# Patient Record
Sex: MALE | Race: WHITE | Employment: FULL TIME | URBAN - METROPOLITAN AREA
[De-identification: names, ages, dates, MRNs, and addresses within clinical notes are randomized per-mention and may not be internally consistent; named-entity substitution may affect disease eponyms.]

---

## 2017-02-15 ENCOUNTER — OFFICE VISIT (OUTPATIENT)
Dept: DERMATOLOGY | Facility: AMBULATORY SURGERY CENTER | Age: 57
End: 2017-02-15

## 2017-02-15 VITALS
BODY MASS INDEX: 33.77 KG/M2 | TEMPERATURE: 98.7 F | DIASTOLIC BLOOD PRESSURE: 84 MMHG | HEIGHT: 69 IN | OXYGEN SATURATION: 98 % | RESPIRATION RATE: 18 BRPM | WEIGHT: 228 LBS | HEART RATE: 60 BPM | SYSTOLIC BLOOD PRESSURE: 124 MMHG

## 2017-02-15 DIAGNOSIS — C44.722 SQUAMOUS CELL CARCINOMA, LEG, RIGHT: Primary | ICD-10-CM

## 2017-02-15 DIAGNOSIS — C44.722 SQUAMOUS CELL CARCINOMA, LEG, RIGHT: ICD-10-CM

## 2017-02-15 RX ORDER — LIDOCAINE HYDROCHLORIDE AND EPINEPHRINE 10; 10 MG/ML; UG/ML
3 INJECTION, SOLUTION INFILTRATION; PERINEURAL ONCE
Qty: 1 VIAL | Refills: 0
Start: 2017-02-15 | End: 2017-02-15

## 2017-02-15 RX ORDER — CEPHALEXIN 500 MG/1
500 CAPSULE ORAL 3 TIMES DAILY
Qty: 21 CAP | Refills: 1 | Status: SHIPPED | OUTPATIENT
Start: 2017-02-15 | End: 2017-10-03 | Stop reason: ALTCHOICE

## 2017-02-15 RX ORDER — BUPIVACAINE HYDROCHLORIDE AND EPINEPHRINE 2.5; 5 MG/ML; UG/ML
INJECTION, SOLUTION INFILTRATION; PERINEURAL
Qty: 1.5 ML | Refills: 0
Start: 2017-02-15 | End: 2017-10-03

## 2017-02-15 NOTE — PROGRESS NOTES
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 Procedure explained with full understanding. Vitals:    02/15/17 0832   BP: 124/84   Pulse: 60   Resp: 18   Temp: 98.7 °F (37.1 °C)   TempSrc: Oral   SpO2: 98%   Weight: 103.4 kg (228 lb)   Height: 5' 9\" (1.753 m)     preoperatively, will continue to monitor. Post-op: Written and verbal post-op wound care instructions given to patient with full understanding of care. Surgical wound bandaged with Vaseline, Telfa, 2x2 gauze, and coverall tape. All questions and concerns addressed. Vitals stable postoperatively.

## 2017-02-15 NOTE — PROGRESS NOTES
This note is written by John Tobias, as dictated by Judah Martínez. Cyril Shabazz MD.    CC: Squamous cell carcinomas on the right anterior shin and right posterior calf    History of present illness:     Micaela Norris is a 61 y.o. male referred by Dr. Charlotte Carroll. He has two biopsy-proven squamous cell carcinomas on the right anterior shin and right posterior calf. These are new squamous cell carcinomas, no prior treatment for either. The lesion on his right anterior shin presented approximately 3 months ago, readily identifiable as similar to Mr. Sara Pryor other lower leg skin cancers. He reports that the lesion on his right posterior calf has been present \"a little longer,\" 3-4 weeks before he noticed the lesion. He had a patch of eczema at the site and had treated with topical steroid, concern arose when this bump remained. He describes this lesion as more tender and inflamed. Biopsies confirmed the diagnoses of squamous cell carcinomas, and I reviewed the written pathology reports. He also notes the his right lower leg has been more problematic in general - hx of DVT, skin cancers, and pain. He is feeling well and in his usual state of health today. He has no pain, no current illnesses, no other skin concerns. His allergies, medications, medical, and social history are reviewed by me today. I treated a squamous cell carcinoma on his right lower medial leg with Mohs surgery in 2016, no symptoms associated with the site. He takes Xarelto. Exam:     He is an awake, alert, and oriented 61 y.o. male who appears well and in no distress. There is no preauricular, submandibular, or cervical lymphadenopathy. I examined his right lower leg. He has a 11 x 6 mm crusted papule with minimal inflammation on his right anterior shin and a 10 x 9 mm inflamed biopsy site on his right posterior calf. He confirms locations.  He has a well-healed scar on his right lower medial leg, no evidence of lesion recurrence. Assessment/plan:    1. Squamous cell carcinoma, right anterior shin. I discussed the diagnosis of squamous cell carcinoma and summarized the pathology report. Mohs surgery is indicated by site, size, and rapid growth. The procedure was discussed, verbal and written consent were obtained. I performed the procedure. One stage was required to reach a tumor free plane. The surgical defect was managed with intermediate repair. There were no complications. He will follow up as needed as the site heals. Indications, risks, and options were discussed with Earlene Shelby preoperatively. Risks including, but not limited to: pain, bleeding, infection, tumor recurrence, scarring and damage to motor and/or sensory nerves, were discussed. Earlene Shelby chose Mohs surgery. Earlene Shelby was an acceptable surgery candidate. Earlene Shelby was placed in the appropriate position on the operating table in the Mohs surgery procedure room. The area was prepped and draped in the standard manner. Gentian violet was used to outline the clinical margins of the tumor. Local anesthesia was then obtained. The grossly visible tumor was then removed, an underlying layer was excised and mapped according to the Mohs technique, and the individual specimens examined microscopically. The process was repeated until microscopic examination of the tissue specimens confirmed a tumor-free plane. Hemostasis was obtained with electrosurgery and pressure. The wound was covered between stages with moist saline gauze. Wound care instructions (written and verbal) and a follow up appointment were given to Earlene Shelby before discharge. Earlene Shelby was discharged in good condition. The wound management options of second intent healing, layered closure, local flap, and/or full thickness skin graft were discussed.  Earlene Shelby understands the aims, risks, alternatives, and possible complications and elects to proceed with an intermediate layered closure. Wound margins were made vertical, edges undermined in the subcutaneous plane, standing cones corrected at both poles followed by layered closure. The wound was closed with buried 4-0 polysorb suture in the subcutis to reduce tension on the skin edges, and skin edges were approximated with 4-0 prolene suture in the dermis to reduce tension on the epidermis. The final closure length was 35 mm. The wound was bandaged with a pressure bandage utilizing Petroleum ointment, Telfa, gauze and Coban. Wound care instructions (written and verbal) and a follow up appointment were given to Jyoti Foote before discharge. Jyoti Foote was discharged in good condition. 2.  Squamous cell carcinoma, right posterior calf. I discussed the diagnosis of squamous cell carcinoma and summarized the pathology report. Mohs surgery is indicated by site, size, and poor definition. The procedure was discussed, verbal and written consent were obtained. I performed the procedure. Two stages was required to reach a tumor free plane. The surgical defect was managed with intermediate repair. There were no complications. He will follow up as needed as the site heals. I prescribed cephalexin 500 mg tid post-op due to inflammation at this site. Indications, risks, and options were discussed with Jyoti Foote preoperatively. Risks including, but not limited to: pain, bleeding, infection, tumor recurrence, scarring and damage to motor and/or sensory nerves, were discussed. Jyoti Foote chose Mohs surgery. Jyoti Foote was an acceptable surgery candidate. Jyoti Foote was placed in the appropriate position on the operating table in the Mohs surgery procedure room. The area was prepped and draped in the standard manner. Gentian violet was used to outline the clinical margins of the tumor. Local anesthesia was then obtained.      The grossly visible tumor was then removed, an underlying layer was excised and mapped according to the Mohs technique, and the individual specimens examined microscopically. The process was repeated until microscopic examination of the tissue specimens confirmed a tumor-free plane. Hemostasis was obtained with electrosurgery and pressure. The wound was covered between stages with moist saline gauze. Wound care instructions (written and verbal) and a follow up appointment were given to Myra Kiran before discharge. Myra Kiran was discharged in good condition. The wound management options of second intent healing, layered closure, local flap, and/or full thickness skin graft were discussed. Myra Kiran understands the aims, risks, alternatives, and possible complications and elects to proceed with an intermediate layered closure. Wound margins were made vertical, edges undermined in the subcutaneous plane, standing cones corrected at both poles followed by layered closure. The wound was closed with buried 4-0 polysorb suture in the subcutis to reduce tension on the skin edges, and skin edges were approximated with 4-0 prolene suture in the dermis to reduce tension on the epidermis. The final closure length was 30 mm. The wound was bandaged with a pressure bandage utilizing Petroleum ointment, Telfa, gauze and Coban. Wound care instructions (written and verbal) and a follow up appointment were given to Myra Kiran before discharge. Myra Kiran was discharged in good condition. 3. History of squamous cell carcinoma. I discussed the diagnosis and recommend routine examinations with Dr. Patrica Lowe for surveillance. The documentation recorded by the scribe accurately reflects the service I personally performed and the decisions made by me.      [facility]   OFFICE PROCEDURE PROGRESS NOTE     Chart reviewed for the following:     Megan Fair Rajeev Lawrence MD, have reviewed the History, Physical and updated the Allergic reactions for [address] performed immediately prior to start of procedure:     Tess Krause MD, have performed the following reviews on Shane Nesbitt prior to the start of the procedure:     * Patient was identified by name and date of birth   * Agreement on procedure being performed was verified   * Risks and Benefits explained to the patient   * Procedure site verified and marked as necessary   * Patient was positioned for comfort   * Consent was signed and verified     Time: 8:40 AM  Date of procedure: 2/15/2017  Procedure performed by: He Sabillon.  Jose Krause MD   Provider assisted by: LPN   Patient assisted by: self   How tolerated by patient: tolerated the procedure well with no complications   Comments: none

## 2017-02-15 NOTE — MR AVS SNAPSHOT
Visit Information Date & Time Provider Department Dept. Phone Encounter #  
 2/15/2017  8:30 AM MD Travis Trivedi [id number] [phone] [id number] Your Appointments 5/8/2017  2:00 PM  
SURGERY with MD Travis Trivedi [address] [facility]) Appt Note: est pt: SCC-Right Lateral Leg-Dr. Dominik Kamara [facility] [id number]  
[address] [place] [id number] Upcoming Health Maintenance Date Due COLONOSCOPY 6/24/2021 DTaP/Tdap/Td series (3 - Td) 3/7/2026 Allergies as of 2/15/2017  Review Complete On: 2/15/2017 By: Sravanthi Brenner LPN Severity Noted Reaction Type Reactions Codeine  05/10/2011   Topical Rash, Itching Current Immunizations  Reviewed on 9/23/2016 Name Date TDAP Vaccine 1/1/2008 Tdap 3/7/2016 Not reviewed this visit You Were Diagnosed With   
  
 Codes Comments Squamous cell carcinoma, leg, right    -  Primary ICD-10-CM: J00.161 ICD-9-CM: 173.72 Squamous cell carcinoma, leg, right     ICD-10-CM: Z49.341 ICD-9-CM: 173.72 Vitals BP Pulse Temp Resp Height(growth percentile) Weight(growth percentile) 124/84 (BP 1 Location: Left arm, BP Patient Position: Sitting) 60 98.7 °F (37.1 °C) (Oral) 18 5' 9\" (1.753 m) 228 lb (103.4 kg) SpO2 BMI Smoking Status 98% 33.67 kg/m2 Never Smoker Vitals History BMI and BSA Data Body Mass Index Body Surface Area  
 33.67 kg/m 2 2.24 m 2 Preferred Pharmacy Pharmacy Name Phone [facility] [address] [phone] Your Updated Medication List  
  
   
This list is accurate as of: 2/15/17  9:04 AM.  Always use your most recent med list.  
  
  
  
  
 lovastatin 20 mg tablet Commonly known as:  MEVACOR  
TAKE ONE TABLET BY MOUTH NIGHTLY AT BEDTIME  
 multivitamin capsule Take 1 capsule by mouth. XARELTO 20 mg Tab tablet Generic drug:  rivaroxaban TAKE ONE TABLET BY MOUTH ONE TIME DAILY ZyrTEC 10 mg Cap Generic drug:  Cetirizine Take 1 capsule by mouth as needed. Patient Instructions WOUND CARE INSTRUCTIONS 1. Keep the dressing clean and dry and do not remove for 48 hours. 2. Then change the dressing once a day as follows: 
a. Wash hands before and after each dressing change. b. Remove dressing and wash site gently with mild soap and water, rinse, and pat dry. 
c. Apply an ointment (Bacitracin, Polysporin, Neosporin, Petroleum jelly or Aquaphor). d. Apply a non-stick (Telfa) dressing or Band-Aid to cover the wound. Remove pressure bandage Friday, then wash gently and apply Vaseline and a band-aid to site daily for 1 week. 3. Watch for: BLEEDING: A small amount of drainage may occur. If bleeding occurs, elevate and rest the surgery site. Apply gauze and steady pressure for 15 minutes. If bleeding continues, call this office. INFECTION: Signs of infection include increased redness, pain, warmth, drainage of pus, and fever. If this occurs, call this office. 4. Special Instructions (follow any that are checked): ·  You have stitches that need to be removed in 14 days ·  Avoid bending at the waist and heavy lifting for two days. ·  Sleep with your head elevated for the next two nights. ·  Rest the surgery site and keep it elevated as much as possible for two days. ·  You may apply an ice-pack for 10-15 minutes every waking hour for the rest of the day. ·  Eat a soft diet and avoid hot food and hot drinks for the rest of the day. ·  Other instructions: Follow up as needed Take Tylenol for pain as needed.  
 
 
Once the site is healed with no remaining bandages or open areas, protect your surgical site and scar from the sun, as this area will be more sensitive. Use a broad spectrum sunscreen SPF 30 or higher daily, and a chemical free product (one containing zinc oxide or titanium dioxide) is a good choice if the area is sensitive. You may begin to gently massage the surgical site in 2-3 weeks, rubbing in a circular motion along the scar. This can help reduce swelling and thickness of a scar. A scar cream may be used beginnning 1 month after the surgery. If you have any questions or concerns, please call our office Monday through Friday at [phone]. Introducing [facility]! Dear [employer]: 
Thank you for requesting a [employer] account. Our records indicate that you already have an active [employer] account. You can access your account anytime at [email or web address] Did you know that you can access your hospital and ER discharge instructions at any time in [employer]? You can also review all of your test results from your hospital stay or ER visit. Additional Information If you have questions, please visit the Frequently Asked Questions section of the [employer] website at [email or web address] Remember, [employer] is NOT to be used for urgent needs. For medical emergencies, dial 911. Now available from your iPhone and Android! Please provide this summary of care documentation to your next provider. Your primary care clinician is listed as Zulma Dyer. If you have any questions after today's visit, please call [phone].

## 2017-02-15 NOTE — PATIENT INSTRUCTIONS
WOUND CARE INSTRUCTIONS    1. Keep the dressing clean and dry and do not remove for 48 hours. 2. Then change the dressing once a day as follows:  a. Wash hands before and after each dressing change. b. Remove dressing and wash site gently with mild soap and water, rinse, and pat dry.  c. Apply an ointment (Bacitracin, Polysporin, Neosporin, Petroleum jelly or Aquaphor). d. Apply a non-stick (Telfa) dressing or Band-Aid to cover the wound. Remove pressure bandage Friday, then wash gently and apply Vaseline and a band-aid to site daily for 1 week. 3. Watch for:  BLEEDING: A small amount of drainage may occur. If bleeding occurs, elevate and rest the surgery site. Apply gauze and steady pressure for 15 minutes. If bleeding continues, call this office. INFECTION: Signs of infection include increased redness, pain, warmth, drainage of pus, and fever. If this occurs, call this office. 4. Special Instructions (follow any that are checked):  · [x] You have stitches that need to be removed in 14 days  · [] Avoid bending at the waist and heavy lifting for two days. · [] Sleep with your head elevated for the next two nights. · [x] Rest the surgery site and keep it elevated as much as possible for two days. · [x] You may apply an ice-pack for 10-15 minutes every waking hour for the rest of the day. · [] Eat a soft diet and avoid hot food and hot drinks for the rest of the day. · [] Other instructions: Follow up as needed  Take Tylenol for pain as needed. Once the site is healed with no remaining bandages or open areas, protect your surgical site and scar from the sun, as this area will be more sensitive. Use a broad spectrum sunscreen SPF 30 or higher daily, and a chemical free product (one containing zinc oxide or titanium dioxide) is a good choice if the area is sensitive. You may begin to gently massage the surgical site in 2-3 weeks, rubbing in a circular motion along the scar.  This can help reduce swelling and thickness of a scar. A scar cream may be used beginnning 1 month after the surgery. If you have any questions or concerns, please call our office Monday through Friday at [phone].

## 2017-03-01 RX ORDER — LOVASTATIN 20 MG/1
TABLET ORAL
Qty: 90 TAB | Refills: 1 | Status: SHIPPED | OUTPATIENT
Start: 2017-03-01 | End: 2017-10-04 | Stop reason: SDUPTHER

## 2017-03-28 ENCOUNTER — TELEPHONE (OUTPATIENT)
Dept: DERMATOLOGY | Facility: AMBULATORY SURGERY CENTER | Age: 57
End: 2017-03-28

## 2017-03-28 PROBLEM — J10.1 INFLUENZA B: Status: ACTIVE | Noted: 2017-03-22

## 2017-07-26 RX ORDER — RIVAROXABAN 20 MG/1
TABLET, FILM COATED ORAL
Qty: 90 TAB | Refills: 0 | Status: SHIPPED | OUTPATIENT
Start: 2017-07-26 | End: 2017-08-28 | Stop reason: SDUPTHER

## 2017-08-28 RX ORDER — RIVAROXABAN 20 MG/1
TABLET, FILM COATED ORAL
Qty: 90 TAB | Refills: 0 | Status: SHIPPED | OUTPATIENT
Start: 2017-08-28 | End: 2018-01-29 | Stop reason: SDUPTHER

## 2017-08-28 NOTE — TELEPHONE ENCOUNTER
[phone] pt's wife calling regarding this med and says that her  is completely out and needs refill asap.

## 2017-10-03 ENCOUNTER — OFFICE VISIT (OUTPATIENT)
Dept: INTERNAL MEDICINE CLINIC | Age: 57
End: 2017-10-03

## 2017-10-03 VITALS
HEART RATE: 74 BPM | DIASTOLIC BLOOD PRESSURE: 80 MMHG | TEMPERATURE: 98.3 F | SYSTOLIC BLOOD PRESSURE: 124 MMHG | BODY MASS INDEX: 33.62 KG/M2 | WEIGHT: 227 LBS | HEIGHT: 69 IN | OXYGEN SATURATION: 97 % | RESPIRATION RATE: 14 BRPM

## 2017-10-03 DIAGNOSIS — Z23 ENCOUNTER FOR IMMUNIZATION: ICD-10-CM

## 2017-10-03 DIAGNOSIS — M25.551 RIGHT HIP PAIN: Primary | ICD-10-CM

## 2017-10-03 NOTE — MR AVS SNAPSHOT
Visit Information Date & Time Provider Department Dept. Phone Encounter #  
 10/3/2017  4:30 PM Sharon Rodriguez, 1229 Counts include 234 beds at the [facility] [phone] [id number] Follow-up Instructions Return if symptoms worsen or fail to improve. Your Appointments 10/24/2017 10:00 AM  
SURGERY with MD Travis Jaquez [address] [facility]) Appt Note: Np SCC Rt Posterior Leg Dr. Gavin Andrews  
 [facility] [address] [facility] [id number] [place] [id number] Upcoming Health Maintenance Date Due INFLUENZA AGE 9 TO ADULT 8/1/2017 COLONOSCOPY 6/24/2021 DTaP/Tdap/Td series (3 - Td) 3/7/2026 Allergies as of 10/3/2017  Review Complete On: 10/3/2017 By: Sharon Rodriguez MD  
  
 Severity Noted Reaction Type Reactions Codeine  05/10/2011   Topical Rash, Itching Current Immunizations  Reviewed on 10/3/2017 Name Date Influenza Vaccine (Quad) PF  Incomplete TDAP Vaccine 1/1/2008 Tdap 3/7/2016 Reviewed by Diana Smith RN on 10/3/2017 at  4:27 PM  
You Were Diagnosed With   
  
 Codes Comments Right hip pain    -  Primary ICD-10-CM: M25.551 ICD-9-CM: 719.45 Encounter for immunization     ICD-10-CM: R03 ICD-9-CM: V03.89 Vitals BP Pulse Temp Resp Height(growth percentile) Weight(growth percentile) 124/80 74 98.3 °F (36.8 °C) (Oral) 14 5' 9\" (1.753 m) 227 lb (103 kg) SpO2 BMI Smoking Status 97% 33.52 kg/m2 Never Smoker BMI and BSA Data Body Mass Index Body Surface Area  
 33.52 kg/m 2 2.24 m 2 Preferred Pharmacy Pharmacy Name Phone [facility] [address] [phone] Your Updated Medication List  
  
   
This list is accurate as of: 10/3/17  4:59 PM.  Always use your most recent med list.  
  
  
  
  
 lovastatin 20 mg tablet Commonly known as:  MEVACOR  
TAKE ONE TABLET BY MOUTH NIGHTLY AT BEDTIME  
  
 multivitamin capsule Take 1 capsule by mouth. XARELTO 20 mg Tab tablet Generic drug:  rivaroxaban TAKE 1 TABLET BY MOUTH ONCE DAILY ZyrTEC 10 mg Cap Generic drug:  Cetirizine Take 1 capsule by mouth as needed. We Performed the Following INFLUENZA VIRUS VAC QUAD,SPLIT,PRESV FREE SYRINGE IM [id number] CPT(R)] VA IMMUNIZ ADMIN,1 SINGLE/COMB VAC/TOXOID [id number] CPT(R)] Follow-up Instructions Return if symptoms worsen or fail to improve. Patient Instructions Heat: apply heating pad 10-15 minutes at a time 3-4 times per day *Can not take these medications together. *You can take Tylenol 500mg (1 tabs every 6 hours, max dose of acetaminophen in 24 hrs is 3,000mg) Stretching: 
Start stretching/exercises (see below). Try to do this 1-2 times per day as tolerated. Hip Arthritis: Exercises Your Care Instructions Here are some examples of exercises for hip arthritis. Start each exercise slowly. Ease off the exercise if you start to have pain. Your doctor or physical therapist will tell you when you can start these exercises and which ones will work best for you. How to do the exercises Straight-leg raises to the outside 1. Lie on your side, with your affected hip on top. 2. Tighten the front thigh muscles of your top leg to keep your knee straight. 3. Keep your hip and your leg straight in line with the rest of your body, and keep your knee pointing forward. Do not drop your hip back. 4. Lift your top leg straight up toward the ceiling, about 12 inches off the floor. Hold for about 6 seconds, then slowly lower your leg. 5. Repeat 8 to 12 times. 6. Switch legs and repeat steps 1 through 5, even if only one hip is sore. Straight-leg raises to the inside 1. Lie on your side with your affected hip on the floor. 2. You can either prop up your other leg on a chair, or you can bend that knee and put that foot in front of your other knee. Do not drop your hip back. 3. Tighten the muscles on the front thigh of your bottom leg to straighten that knee. 4. Keep your kneecap pointing forward and your leg straight, and lift your bottom leg up toward the ceiling about 6 inches. Hold for about 6 seconds, then lower slowly. 5. Repeat 8 to 12 times. 6. Switch legs and repeat steps 1 through 5, even if only one hip is sore. Hip hike 1. Stand sideways on the bottom step of a staircase, and hold on to the banister or wall. 2. Keeping both knees straight, lift your good leg off the step and let it hang down. Then hike your good hip up to the same level as your affected hip or a little higher. 3. Repeat 8 to 12 times. 4. Switch legs and repeat steps 1 through 3, even if only one hip is sore. Bridging 1. Lie on your back with both knees bent. Your knees should be bent about 90 degrees. 2. Then push your feet into the floor, squeeze your buttocks, and lift your hips off the floor until your shoulders, hips, and knees are all in a straight line. 3. Hold for about 6 seconds as you continue to breathe normally, and then slowly lower your hips back down to the floor and rest for up to 10 seconds. 4. Repeat 8 to 12 times. Hamstring stretch (lying down) 1. Lie flat on your back with your legs straight. If you feel discomfort in your back, place a small towel roll under your lower back. 2. Holding the back of your affected leg, lift your leg straight up and toward your body until you feel a stretch at the back of your thigh. 3. Hold the stretch for at least 30 seconds. 4. Repeat 2 to 4 times. 5. Switch legs and repeat steps 1 through 4, even if only one hip is sore. Standing quadriceps stretch 1.  If you are not steady on your feet, hold on to a chair, counter, or wall. You can also lie on your stomach or your side to do this exercise. 2. Bend the knee of the leg you want to stretch, and reach behind you to grab the front of your foot or ankle with the hand on the same side. For example, if you are stretching your right leg, use your right hand. 3. Keeping your knees next to each other, pull your foot toward your buttock until you feel a gentle stretch across the front of your hip and down the front of your thigh. Your knee should be pointed directly to the ground, and not out to the side. 4. Hold the stretch for at least 15 to 30 seconds. 5. Repeat 2 to 4 times. 6. Switch legs and repeat steps 1 through 5, even if only one hip is sore. Hip rotator stretch 1. Lie on your back with both knees bent and your feet flat on the floor. 2. Put the ankle of your affected leg on your opposite thigh near your knee. 3. Use your hand to gently push your knee away from your body until you feel a gentle stretch around your hip. 4. Hold the stretch for 15 to 30 seconds. 5. Repeat 2 to 4 times. 6. Repeat steps 1 through 5, but this time use your hand to gently pull your knee toward your opposite shoulder. 7. Switch legs and repeat steps 1 through 6, even if only one hip is sore. Knee-to-chest 
 
1. Lie on your back with your knees bent and your feet flat on the floor. 2. Bring your affected leg to your chest, keeping the other foot flat on the floor (or keeping the other leg straight, whichever feels better on your lower back). 3. Keep your lower back pressed to the floor. Hold for at least 15 to 30 seconds. 4. Relax, and lower the knee to the starting position. 5. Repeat 2 to 4 times. 6. Switch legs and repeat steps 1 through 5, even if only one hip is sore. 7. To get more stretch, put your other leg flat on the floor while pulling your knee to your chest. 
Clamshell 1. Lie on your side, with your affected hip on top.  Keep your feet and knees together and your knees bent. 2. Raise your top knee, but keep your feet together. Do not let your hips roll back. Your legs should open up like a clamshell. 3. Hold for 6 seconds. 4. Slowly lower your knee back down. Rest for 10 seconds. 5. Repeat 8 to 12 times. 6. Switch legs and repeat steps 1 through 5, even if only one hip is sore. Follow-up care is a key part of your treatment and safety. Be sure to make and go to all appointments, and call your doctor if you are having problems. It's also a good idea to know your test results and keep a list of the medicines you take. Where can you learn more? Go to http://dani-adelita.info/. Enter P057 in the search box to learn more about \"Hip Arthritis: Exercises. \" Current as of: March 21, 2017 Content Version: 11.3 © 7749-6532 [employer]. Care instructions adapted under license by [employer] (which disclaims liability or warranty for this information). If you have questions about a medical condition or this instruction, always ask your healthcare professional. [place] [address] any warranty or liability for your use of this information. Introducing [facility]! Dear Kimberly Hughes: 
Thank you for requesting a [employer] account. Our records indicate that you already have an active [employer]Live account. You can access your account anytime at [email or web address] Whatâ€™s More Alive Than You/Helmedix Did you know that you can access your hospital and ER discharge instructions at any time in [employer]Live? You can also review all of your test results from your hospital stay or ER visit. Additional Information If you have questions, please visit the Frequently Asked Questions section of the [employer]Live website at [email or web address] Whatâ€™s More Alive Than You/[email or web address]/. Remember, [employer]Live is NOT to be used for urgent needs. For medical emergencies, dial 911. Now available from your iPhone and Android! Please provide this summary of care documentation to your next provider. Your primary care clinician is listed as Benita Hi. If you have any questions after today's visit, please call [phone].

## 2017-10-03 NOTE — PROGRESS NOTES
Mike Lira is a 61 y.o. male who was seen in clinic today (10/3/2017). He RTC with his wife    Assessment & Plan:  Diagnoses and all orders for this visit:    1. Right hip pain- this is a new problem, symptoms are: not changed, differential dx reviewed with the patient, and is of unclear etiology. Favor more muscular than OA. Reviewed history of DVT and unlikely. Reviewed pelvic pathology, unlikely at this time. Not reproducible today. Will treat with: heat, rest, stretching. See AVS, Red flags were reviewed with the patient to RTC or notify me, expected time course for resolution reviewed. 2. Encounter for immunization  -     INFLUENZA VIRUS VACCINE QUADRIVALENT, PRESERVATIVE FREE SYRINGE (19602)  -     GA IMMUNIZ ADMIN,1 SINGLE/COMB VAC/TOXOID         Follow-up Disposition:  Return if symptoms worsen or fail to improve.       ----------------------------------------------------------------------    Subjective:  James Christine was seen today for Hip Pain    He presents do to pain of his right hip that is secondary to no known injury and started a few weeks ago. Since it started his pain has not changed. He describes the pain as tooth aches. It is constant but fluctuating in intensity (better in the AM and worse in the PM). The pain radiates: no where. He denies numbness, denies paresthesias. Exacerbating factors identifiable by patient are sitting. He has tried the following: Tylenol and standing. These have been: temporarily effective. Previous workup: none. CT abd/pelvis: 7/6/11- reports degenerative changes in bilateral hips        Prior to Admission medications    Medication Sig Start Date End Date Taking?  Authorizing Provider   XARELTO 20 mg tab tablet TAKE 1 TABLET BY MOUTH ONCE DAILY 8/28/17  Yes Julia Michaels MD   lovastatin (MEVACOR) 20 mg tablet TAKE ONE TABLET BY MOUTH NIGHTLY AT BEDTIME 3/1/17  Yes Julia Michaels MD   Cetirizine (ZYRTEC) 10 mg Cap Take 1 capsule by mouth as needed. Yes Historical Provider   multivitamin capsule Take 1 capsule by mouth. Yes Historical Provider          Allergies   Allergen Reactions    Codeine Rash and Itching           ROS - per HPI       Objective:   Physical Exam   Cardiovascular: Regular rhythm and normal heart sounds. No murmur heard. Pulmonary/Chest: Effort normal and breath sounds normal. He has no wheezes. He has no rales. Abdominal: Soft. Bowel sounds are normal. He exhibits no mass. There is no hepatosplenomegaly. There is no tenderness. No hernia. Musculoskeletal:        Right hip: He exhibits normal range of motion, normal strength, no tenderness, no bony tenderness and no deformity. Visit Vitals    /80    Pulse 74    Temp 98.3 °F (36.8 °C) (Oral)    Resp 14    Ht 5' 9\" (1.753 m)    Wt 227 lb (103 kg)    SpO2 97%    BMI 33.52 kg/m2         Disclaimer:  Advised him to call back or return to office if symptoms worsen/change/persist.  Discussed expected course/resolution/complications of diagnosis in detail with patient. Medication risks/benefits/costs/interactions/alternatives discussed with patient. He was given an after visit summary which includes diagnoses, current medications, & vitals. He expressed understanding with the diagnosis and plan.         Yordan Santos MD

## 2017-10-03 NOTE — PATIENT INSTRUCTIONS
Heat: apply heating pad 10-15 minutes at a time 3-4 times per day      *Can not take these medications together. *You can take Tylenol 500mg (1 tabs every 6 hours, max dose of acetaminophen in 24 hrs is 3,000mg)    Stretching:  Start stretching/exercises (see below). Try to do this 1-2 times per day as tolerated. Hip Arthritis: Exercises  Your Care Instructions  Here are some examples of exercises for hip arthritis. Start each exercise slowly. Ease off the exercise if you start to have pain. Your doctor or physical therapist will tell you when you can start these exercises and which ones will work best for you. How to do the exercises  Straight-leg raises to the outside    1. Lie on your side, with your affected hip on top. 2. Tighten the front thigh muscles of your top leg to keep your knee straight. 3. Keep your hip and your leg straight in line with the rest of your body, and keep your knee pointing forward. Do not drop your hip back. 4. Lift your top leg straight up toward the ceiling, about 12 inches off the floor. Hold for about 6 seconds, then slowly lower your leg. 5. Repeat 8 to 12 times. 6. Switch legs and repeat steps 1 through 5, even if only one hip is sore. Straight-leg raises to the inside    1. Lie on your side with your affected hip on the floor. 2. You can either prop up your other leg on a chair, or you can bend that knee and put that foot in front of your other knee. Do not drop your hip back. 3. Tighten the muscles on the front thigh of your bottom leg to straighten that knee. 4. Keep your kneecap pointing forward and your leg straight, and lift your bottom leg up toward the ceiling about 6 inches. Hold for about 6 seconds, then lower slowly. 5. Repeat 8 to 12 times. 6. Switch legs and repeat steps 1 through 5, even if only one hip is sore. Hip hike    1. Stand sideways on the bottom step of a staircase, and hold on to the banister or wall.   2. Keeping both knees straight, lift your good leg off the step and let it hang down. Then hike your good hip up to the same level as your affected hip or a little higher. 3. Repeat 8 to 12 times. 4. Switch legs and repeat steps 1 through 3, even if only one hip is sore. Bridging    1. Lie on your back with both knees bent. Your knees should be bent about 90 degrees. 2. Then push your feet into the floor, squeeze your buttocks, and lift your hips off the floor until your shoulders, hips, and knees are all in a straight line. 3. Hold for about 6 seconds as you continue to breathe normally, and then slowly lower your hips back down to the floor and rest for up to 10 seconds. 4. Repeat 8 to 12 times. Hamstring stretch (lying down)    1. Lie flat on your back with your legs straight. If you feel discomfort in your back, place a small towel roll under your lower back. 2. Holding the back of your affected leg, lift your leg straight up and toward your body until you feel a stretch at the back of your thigh. 3. Hold the stretch for at least 30 seconds. 4. Repeat 2 to 4 times. 5. Switch legs and repeat steps 1 through 4, even if only one hip is sore. Standing quadriceps stretch    1. If you are not steady on your feet, hold on to a chair, counter, or wall. You can also lie on your stomach or your side to do this exercise. 2. Bend the knee of the leg you want to stretch, and reach behind you to grab the front of your foot or ankle with the hand on the same side. For example, if you are stretching your right leg, use your right hand. 3. Keeping your knees next to each other, pull your foot toward your buttock until you feel a gentle stretch across the front of your hip and down the front of your thigh. Your knee should be pointed directly to the ground, and not out to the side. 4. Hold the stretch for at least 15 to 30 seconds. 5. Repeat 2 to 4 times. 6. Switch legs and repeat steps 1 through 5, even if only one hip is sore.   Hip rotator stretch    1. Lie on your back with both knees bent and your feet flat on the floor. 2. Put the ankle of your affected leg on your opposite thigh near your knee. 3. Use your hand to gently push your knee away from your body until you feel a gentle stretch around your hip. 4. Hold the stretch for 15 to 30 seconds. 5. Repeat 2 to 4 times. 6. Repeat steps 1 through 5, but this time use your hand to gently pull your knee toward your opposite shoulder. 7. Switch legs and repeat steps 1 through 6, even if only one hip is sore. Knee-to-chest    1. Lie on your back with your knees bent and your feet flat on the floor. 2. Bring your affected leg to your chest, keeping the other foot flat on the floor (or keeping the other leg straight, whichever feels better on your lower back). 3. Keep your lower back pressed to the floor. Hold for at least 15 to 30 seconds. 4. Relax, and lower the knee to the starting position. 5. Repeat 2 to 4 times. 6. Switch legs and repeat steps 1 through 5, even if only one hip is sore. 7. To get more stretch, put your other leg flat on the floor while pulling your knee to your chest.  Clamshell    1. Lie on your side, with your affected hip on top. Keep your feet and knees together and your knees bent. 2. Raise your top knee, but keep your feet together. Do not let your hips roll back. Your legs should open up like a clamshell. 3. Hold for 6 seconds. 4. Slowly lower your knee back down. Rest for 10 seconds. 5. Repeat 8 to 12 times. 6. Switch legs and repeat steps 1 through 5, even if only one hip is sore. Follow-up care is a key part of your treatment and safety. Be sure to make and go to all appointments, and call your doctor if you are having problems. It's also a good idea to know your test results and keep a list of the medicines you take. Where can you learn more? Go to http://dani-adelita.info/.   Enter L390 in the search box to learn more about \"Hip Arthritis: Exercises. \"  Current as of: March 21, 2017  Content Version: 11.3  © 2522-9720 [employer]. Care instructions adapted under license by [employer] (which disclaims liability or warranty for this information). If you have questions about a medical condition or this instruction, always ask your healthcare professional. [place] [address] any warranty or liability for your use of this information.

## 2017-10-03 NOTE — PROGRESS NOTES
Right hip pain for a couple of weeks. CAROLINEТАТЬЯНАMerline Hanson is a 61 y.o. male  who present for routine immunizations. he  denies any symptoms , reactions or allergies that would exclude them from being immunized today. Risks and adverse reactions were discussed and the VIS was given to them. All questions were addressed. he was observed for 5 min post injection. There were no reactions observed.     Sarah Watts RN

## 2017-10-04 RX ORDER — LOVASTATIN 20 MG/1
TABLET ORAL
Qty: 90 TAB | Refills: 0 | Status: SHIPPED | OUTPATIENT
Start: 2017-10-04 | End: 2018-01-10 | Stop reason: SDUPTHER

## 2017-10-24 ENCOUNTER — OFFICE VISIT (OUTPATIENT)
Dept: DERMATOLOGY | Facility: AMBULATORY SURGERY CENTER | Age: 57
End: 2017-10-24

## 2017-10-24 VITALS
BODY MASS INDEX: 33.63 KG/M2 | RESPIRATION RATE: 18 BRPM | HEIGHT: 69 IN | OXYGEN SATURATION: 99 % | SYSTOLIC BLOOD PRESSURE: 130 MMHG | DIASTOLIC BLOOD PRESSURE: 80 MMHG | HEART RATE: 57 BPM | WEIGHT: 227.07 LBS | TEMPERATURE: 98.8 F

## 2017-10-24 DIAGNOSIS — C44.722 SQUAMOUS CELL CARCINOMA OF RIGHT LOWER LEG: Primary | ICD-10-CM

## 2017-10-24 RX ORDER — CEPHALEXIN 500 MG/1
500 CAPSULE ORAL 3 TIMES DAILY
Qty: 21 CAP | Refills: 0 | Status: SHIPPED | OUTPATIENT
Start: 2017-10-24 | End: 2017-10-31

## 2017-10-24 NOTE — PROGRESS NOTES
This note is written by Jose D Albright, as dictated by Marion Schumacher. Faiza Villanueva MD.    CC: Squamous cell carcinoma on the right posterior leg     History of present illness:     Jyoti Foote is a 61 y.o. male referred by Dr. Lori Borges. He has a biopsy-proven well differentiated squamous cell carcinoma on the right posterior leg. This is a new squamous cell carcinoma present for several months described as an aggravated bump with no prior treatment. Biopsy confirmed the diagnosis of squamous cell carcinoma, and I reviewed the written pathology report. He has expressed concerns regarding the frequency of skin cancers on his right leg. He is feeling well and in his usual state of health today. He has no pain, no current illnesses, no other skin concerns. His allergies, medications, medical, and social history are reviewed by me today. I performed Mohs surgery on 02/15/2017 to treat squamous cell carcinomas on his right anterior shin and right posterior calf. He is taking the blood thinner Xarelto due to DVT. Exam:     He is an awake, alert, and oriented 61 y.o. male who appears well and in no distress. I examined his right leg. He has a 26 x 10 mm pink keratotic plaque, in the region of but separate from surgical scar from February 2017, on his right posterior leg. He confirms location. He has well healed surgical sites on his right anterior shin and right posterior calf. Assessment/plan:    1. Squamous cell carcinoma, right posterior leg. I discussed the diagnosis of squamous cell carcinoma and summarized the pathology report. Mohs surgery is indicated by site, size, and histology. The procedure was discussed, verbal and written consent were obtained. I performed the procedure. One stage was required to reach a tumor free plane. The surgical defect was managed with intermediate repair. There were no complications.  I prescribed a post-operative medication of Keflex at his request; use and side effects discussed. He will follow up as needed as the site heals. His wife will remove sutures in 2 weeks. Indications, risks, and options were discussed with Fina Baker preoperatively. Risks including, but not limited to: pain, bleeding, infection, tumor recurrence, scarring and damage to motor and/or sensory nerves, were discussed. Fina Baker chose Mohs surgery. Fina Baker was an acceptable surgery candidate. Fina Baker was placed in the appropriate position on the operating table in the Mohs surgery procedure room. The area was prepped and draped in the standard manner. Gentian violet was used to outline the clinical margins of the tumor. Local anesthesia was then obtained. The grossly visible tumor was then removed, an underlying layer was excised and mapped according to the Mohs technique, and the individual specimens examined microscopically. The process was repeated until microscopic examination of the tissue specimens confirmed a tumor-free plane. Hemostasis was obtained with electrosurgery and pressure. The wound was covered between stages with moist saline gauze. The wound management options of second intent healing, layered closure, local flap, and/or full thickness skin graft were discussed. Fina Baker understands the aims, risks, alternatives, and possible complications and elects to proceed with an intermediate layered closure. Wound margins were made vertical, edges undermined in the subcutaneous plane, standing cones corrected at both poles followed by layered closure. The wound was closed with buried 4-0 polysorb suture in the subcutis to reduce tension on the skin edges, and skin edges were approximated with 4-0 prolene suture in the dermis to reduce tension on the epidermis. The final closure length was 44 mm. The wound was bandaged with Vaseline, Telfa, gauze and Coban.  Wound care instructions (written and verbal) and a follow up appointment were given to Demi Hutton before discharge. Demi Hutton was discharged in good condition. 2. History of nonmelanoma skin cancer. We discussed his history of multiple squamous cell carcinomas on the right lower extremity. He questioned if this could be related to the DVT he had in this leg. I could not make a definite association between the 2 issues or his use of blood thinner. I advised routine examinations with Dr. Abhilash Smith, self examinations and awareness of the symptoms of squamous cell carcinoma with which he is familiar, prompt evaluation of any new lesions. I also discussed strict sun protection of his skin to further reduce his risk. The documentation recorded by the scribe accurately reflects the service I personally performed and the decisions made by me. [facility]   OFFICE PROCEDURE PROGRESS NOTE     Chart reviewed for the following:     Juanita Lomas MD, have reviewed the History, Physical and updated the Allergic reactions for [address] performed immediately prior to start of procedure:     Juanita Lomas MD, have performed the following reviews on Demi Hutton prior to the start of the procedure:     * Patient was identified by name and date of birth   * Agreement on procedure being performed was verified   * Risks and Benefits explained to the patient   * Procedure site verified and marked as necessary   * Patient was positioned for comfort   * Consent was signed and verified     Time: 10:23 AM  Date of procedure: 10/24/2017  Procedure performed by: Rodrigo Camilo.  Naveed Lomas MD   Provider assisted by: RN   Patient assisted by: self   How tolerated by patient: tolerated the procedure well with no complications   Comments: none

## 2017-10-24 NOTE — MR AVS SNAPSHOT
Visit Information Date & Time Provider Department Dept. Phone Encounter #  
 10/24/2017 10:00 AM Roman Smith MD [facility] [id number]  Upcoming Health Maintenance Date Due COLONOSCOPY 6/24/2021 DTaP/Tdap/Td series (3 - Td) 3/7/2026 Allergies as of 10/24/2017  Review Complete On: 10/24/2017 By: Humberto Tripp RN Severity Noted Reaction Type Reactions Codeine  05/10/2011   Topical Rash, Itching Current Immunizations  Reviewed on 10/3/2017 Name Date Influenza Vaccine (Quad) PF 10/3/2017 TDAP Vaccine 1/1/2008 Tdap 3/7/2016 Not reviewed this visit You Were Diagnosed With   
  
 Codes Comments Squamous cell carcinoma of right lower leg    -  Primary ICD-10-CM: H28.119 ICD-9-CM: 173.72 Vitals BP Pulse Temp Resp Height(growth percentile) Weight(growth percentile) 130/80 (BP 1 Location: Left arm, BP Patient Position: Sitting) (!) 57 98.8 °F (37.1 °C) (Oral) 18 5' 9\" (1.753 m) 227 lb 1.2 oz (103 kg) SpO2 BMI Smoking Status 99% 33.53 kg/m2 Never Smoker BMI and BSA Data Body Mass Index Body Surface Area  
 33.53 kg/m 2 2.24 m 2 Preferred Pharmacy Pharmacy Name Phone [facility] [phone] Your Updated Medication List  
  
   
This list is accurate as of: 10/24/17 10:49 AM.  Always use your most recent med list.  
  
  
  
  
 lovastatin 20 mg tablet Commonly known as:  MEVACOR  
TAKE ONE TABLET BY MOUTH NIGHTLY AT BEDTIME  
  
 multivitamin capsule Take 1 capsule by mouth. XARELTO 20 mg Tab tablet Generic drug:  rivaroxaban TAKE 1 TABLET BY MOUTH ONCE DAILY ZyrTEC 10 mg Cap Generic drug:  Cetirizine Take 1 capsule by mouth as needed. Patient Instructions WOUND CARE INSTRUCTIONS 1. Keep the dressing clean and dry and do not remove for 48 hours. 2. Then change the dressing once a day as follows: 
a. Wash hands before and after each dressing change. b. Remove dressing and wash site gently with mild soap and water, rinse, and pat dry. 
c. Apply an ointment (Bacitracin, Polysporin, Neosporin, Petroleum jelly or Aquaphor). d. Apply a non-stick (Telfa) dressing or Band-Aid to cover the wound. Remove pressure bandage on Thursday, then wash gently and apply a thin layer Vaseline and a band-aid to site daily for 1 week. 3. Watch for: BLEEDING: A small amount of drainage may occur. If bleeding occurs, elevate and rest the surgery site. Apply gauze and steady pressure for 15 minutes. If bleeding continues, call this office. INFECTION: Signs of infection include increased redness, pain, warmth, drainage of pus, and fever. If this occurs, call this office. 4. Special Instructions (follow any that are checked): ·  You have stitches that DO need to be removed in two weeks. ·  Avoid bending at the waist and heavy lifting for two days. ·  Sleep with your head elevated for the next two nights. ·  Rest the surgery site and keep it elevated as much as possible for two days. ·  You may apply an ice-pack for 10-15 minutes every waking hour for the rest of the day. ·  Eat a soft diet and avoid hot food and hot drinks for the rest of the day. ·  Other instructions: Follow up as directed. Take Tylenol or Ibuprofen for pain as needed. Once the site is healed with no remaining bandages or open areas, protect your surgical site and scar from the sun, as this area will be more sensitive. Use a broad spectrum sunscreen SPF 30 or higher daily, and a chemical free product (one containing zinc oxide or titanium dioxide) is a good choice if the area is sensitive. You may begin to gently massage the surgical site in 2-3 weeks, rubbing in a circular motion along the scar.  This can help reduce swelling and thickness of a scar. A scar cream may be used beginnning 1 month after the surgery. If you have any questions or concerns, please call our office Monday through Friday at [phone]. Introducing [facility]! Dear Shasta Soulier: 
Thank you for requesting a [employer] account. Our records indicate that you already have an active [employer] account. You can access your account anytime at [email or web address] Did you know that you can access your hospital and ER discharge instructions at any time in [employer]? You can also review all of your test results from your hospital stay or ER visit. Additional Information If you have questions, please visit the Frequently Asked Questions section of the [employer] website at [email or web address] Remember, [employer] is NOT to be used for urgent needs. For medical emergencies, dial 911. Now available from your iPhone and Android! Please provide this summary of care documentation to your next provider. Your primary care clinician is listed as Lisa Collado. If you have any questions after today's visit, please call [phone].

## 2017-10-24 NOTE — PATIENT INSTRUCTIONS
WOUND CARE INSTRUCTIONS    1. Keep the dressing clean and dry and do not remove for 48 hours. 2. Then change the dressing once a day as follows:  a. Wash hands before and after each dressing change. b. Remove dressing and wash site gently with mild soap and water, rinse, and pat dry.  c. Apply an ointment (Bacitracin, Polysporin, Neosporin, Petroleum jelly or Aquaphor). d. Apply a non-stick (Telfa) dressing or Band-Aid to cover the wound. Remove pressure bandage on Thursday, then wash gently and apply a thin layer Vaseline and a band-aid to site daily for 1 week. 3. Watch for:  BLEEDING: A small amount of drainage may occur. If bleeding occurs, elevate and rest the surgery site. Apply gauze and steady pressure for 15 minutes. If bleeding continues, call this office. INFECTION: Signs of infection include increased redness, pain, warmth, drainage of pus, and fever. If this occurs, call this office. 4. Special Instructions (follow any that are checked):  · [x] You have stitches that DO need to be removed in two weeks. · [x] Avoid bending at the waist and heavy lifting for two days. · [] Sleep with your head elevated for the next two nights. · [x] Rest the surgery site and keep it elevated as much as possible for two days. · [x] You may apply an ice-pack for 10-15 minutes every waking hour for the rest of the day. · [] Eat a soft diet and avoid hot food and hot drinks for the rest of the day. · [] Other instructions: Follow up as directed. Take Tylenol or Ibuprofen for pain as needed. Once the site is healed with no remaining bandages or open areas, protect your surgical site and scar from the sun, as this area will be more sensitive. Use a broad spectrum sunscreen SPF 30 or higher daily, and a chemical free product (one containing zinc oxide or titanium dioxide) is a good choice if the area is sensitive.     You may begin to gently massage the surgical site in 2-3 weeks, rubbing in a circular motion along the scar. This can help reduce swelling and thickness of a scar. A scar cream may be used beginnning 1 month after the surgery. If you have any questions or concerns, please call our office Monday through Friday at [phone].

## 2017-10-24 NOTE — PROGRESS NOTES
Pre-op: Patient presents today for the evaluation of SCC to the right posterior leg. Procedure explained with full understanding. Vitals:    10/24/17 0956   BP: 130/80   Pulse: (!) 57   Resp: 18   Temp: 98.8 °F (37.1 °C)   TempSrc: Oral   SpO2: 99%   Weight: 103 kg (227 lb 1.2 oz)   Height: 5' 9\" (1.753 m)     preoperatively, will continue to monitor. Post-op: Written and verbal post-op wound care instructions given to patient with full understanding of care. Surgical wound bandaged with Vaseline, Telfa, 2x2 gauze, and coverall tape. All questions and concerns addressed. Vitals stable postoperatively.

## 2018-01-11 RX ORDER — LOVASTATIN 20 MG/1
TABLET ORAL
Qty: 90 TAB | Refills: 0 | Status: SHIPPED | OUTPATIENT
Start: 2018-01-11 | End: 2018-02-01 | Stop reason: SDUPTHER

## 2018-01-11 NOTE — TELEPHONE ENCOUNTER
Has not been seen in > 1 yrs (Dec '16) for labs & a regular f/u.   Needs to schedule this this month for labs

## 2018-01-26 DIAGNOSIS — Z12.5 PROSTATE CANCER SCREENING: ICD-10-CM

## 2018-01-26 DIAGNOSIS — Z86.718 HISTORY OF DVT (DEEP VEIN THROMBOSIS): ICD-10-CM

## 2018-01-26 DIAGNOSIS — E78.00 PURE HYPERCHOLESTEROLEMIA: Primary | ICD-10-CM

## 2018-01-27 LAB
ALBUMIN SERPL-MCNC: 4.6 G/DL (ref 3.5–5.5)
ALBUMIN/GLOB SERPL: 1.9 {RATIO} (ref 1.2–2.2)
ALP SERPL-CCNC: 64 IU/L (ref 39–117)
ALT SERPL-CCNC: 25 IU/L (ref 0–44)
AST SERPL-CCNC: 21 IU/L (ref 0–40)
BASOPHILS # BLD AUTO: 0 X10E3/UL (ref 0–0.2)
BASOPHILS NFR BLD AUTO: 1 %
BILIRUB SERPL-MCNC: 0.5 MG/DL (ref 0–1.2)
BUN SERPL-MCNC: 17 MG/DL (ref 6–24)
BUN/CREAT SERPL: 18 (ref 9–20)
CALCIUM SERPL-MCNC: 9.5 MG/DL (ref 8.7–10.2)
CHLORIDE SERPL-SCNC: 104 MMOL/L (ref 96–106)
CHOLEST SERPL-MCNC: 158 MG/DL (ref 100–199)
CO2 SERPL-SCNC: 22 MMOL/L (ref 18–29)
CREAT SERPL-MCNC: 0.96 MG/DL (ref 0.76–1.27)
EOSINOPHIL # BLD AUTO: 0.2 X10E3/UL (ref 0–0.4)
EOSINOPHIL NFR BLD AUTO: 5 %
ERYTHROCYTE [DISTWIDTH] IN BLOOD BY AUTOMATED COUNT: 14.3 % (ref 12.3–15.4)
GFR SERPLBLD CREATININE-BSD FMLA CKD-EPI: 100 ML/MIN/1.73
GFR SERPLBLD CREATININE-BSD FMLA CKD-EPI: 87 ML/MIN/1.73
GLOBULIN SER CALC-MCNC: 2.4 G/DL (ref 1.5–4.5)
GLUCOSE SERPL-MCNC: 100 MG/DL (ref 65–99)
HCT VFR BLD AUTO: 48.2 % (ref 37.5–51)
HDLC SERPL-MCNC: 68 MG/DL
HGB BLD-MCNC: 15.7 G/DL (ref 13–17.7)
IMM GRANULOCYTES # BLD: 0 X10E3/UL (ref 0–0.1)
IMM GRANULOCYTES NFR BLD: 0 %
LDLC SERPL CALC-MCNC: 76 MG/DL (ref 0–99)
LYMPHOCYTES # BLD AUTO: 1.6 X10E3/UL (ref 0.7–3.1)
LYMPHOCYTES NFR BLD AUTO: 34 %
MCH RBC QN AUTO: 29.6 PG (ref 26.6–33)
MCHC RBC AUTO-ENTMCNC: 32.6 G/DL (ref 31.5–35.7)
MCV RBC AUTO: 91 FL (ref 79–97)
MONOCYTES # BLD AUTO: 0.4 X10E3/UL (ref 0.1–0.9)
MONOCYTES NFR BLD AUTO: 9 %
NEUTROPHILS # BLD AUTO: 2.5 X10E3/UL (ref 1.4–7)
NEUTROPHILS NFR BLD AUTO: 51 %
PLATELET # BLD AUTO: 215 X10E3/UL (ref 150–379)
POTASSIUM SERPL-SCNC: 4.6 MMOL/L (ref 3.5–5.2)
PROT SERPL-MCNC: 7 G/DL (ref 6–8.5)
PSA SERPL-MCNC: 1.3 NG/ML (ref 0–4)
RBC # BLD AUTO: 5.31 X10E6/UL (ref 4.14–5.8)
SODIUM SERPL-SCNC: 143 MMOL/L (ref 134–144)
TRIGL SERPL-MCNC: 72 MG/DL (ref 0–149)
VLDLC SERPL CALC-MCNC: 14 MG/DL (ref 5–40)
WBC # BLD AUTO: 4.8 X10E3/UL (ref 3.4–10.8)

## 2018-01-28 PROBLEM — R73.03 PREDIABETES: Status: ACTIVE | Noted: 2018-01-28

## 2018-01-29 ENCOUNTER — OFFICE VISIT (OUTPATIENT)
Dept: INTERNAL MEDICINE CLINIC | Age: 58
End: 2018-01-29

## 2018-01-29 VITALS
WEIGHT: 225 LBS | HEART RATE: 76 BPM | OXYGEN SATURATION: 96 % | TEMPERATURE: 98.2 F | DIASTOLIC BLOOD PRESSURE: 78 MMHG | RESPIRATION RATE: 16 BRPM | BODY MASS INDEX: 32.21 KG/M2 | SYSTOLIC BLOOD PRESSURE: 120 MMHG | HEIGHT: 70 IN

## 2018-01-29 DIAGNOSIS — I82.811 SUPERFICIAL THROMBOSIS OF RIGHT LOWER EXTREMITY: ICD-10-CM

## 2018-01-29 DIAGNOSIS — Z00.00 ROUTINE PHYSICAL EXAMINATION: Primary | ICD-10-CM

## 2018-01-29 DIAGNOSIS — R73.03 PREDIABETES: ICD-10-CM

## 2018-01-29 DIAGNOSIS — E66.9 OBESITY (BMI 30.0-34.9): ICD-10-CM

## 2018-01-29 DIAGNOSIS — Z86.718 HISTORY OF DVT (DEEP VEIN THROMBOSIS): ICD-10-CM

## 2018-01-29 DIAGNOSIS — E78.00 PURE HYPERCHOLESTEROLEMIA: ICD-10-CM

## 2018-01-29 PROBLEM — J10.1 INFLUENZA B: Status: RESOLVED | Noted: 2017-03-22 | Resolved: 2018-01-29

## 2018-01-29 NOTE — PATIENT INSTRUCTIONS
Well Visit, Men 48 to 72: Care Instructions  Your Care Instructions    Physical exams can help you stay healthy. Your doctor has checked your overall health and may have suggested ways to take good care of yourself. He or she also may have recommended tests. At home, you can help prevent illness with healthy eating, regular exercise, and other steps. Follow-up care is a key part of your treatment and safety. Be sure to make and go to all appointments, and call your doctor if you are having problems. It's also a good idea to know your test results and keep a list of the medicines you take. How can you care for yourself at home? · Reach and stay at a healthy weight. This will lower your risk for many problems, such as obesity, diabetes, heart disease, and high blood pressure. · Get at least 30 minutes of exercise on most days of the week. Walking is a good choice. You also may want to do other activities, such as running, swimming, cycling, or playing tennis or team sports. · Do not smoke. Smoking can make health problems worse. If you need help quitting, talk to your doctor about stop-smoking programs and medicines. These can increase your chances of quitting for good. · Protect your skin from too much sun. When you're outdoors from 10 a.m. to 4 p.m., stay in the shade or cover up with clothing and a hat with a wide brim. Wear sunglasses that block UV rays. Even when it's cloudy, put broad-spectrum sunscreen (SPF 30 or higher) on any exposed skin. · See a dentist one or two times a year for checkups and to have your teeth cleaned. · Wear a seat belt in the car. · Limit alcohol to 2 drinks a day. Too much alcohol can cause health problems. Follow your doctor's advice about when to have certain tests. These tests can spot problems early. · Cholesterol.  Your doctor will tell you how often to have this done based on your overall health and other things that can increase your risk for heart attack and stroke. · Blood pressure. Have your blood pressure checked during a routine doctor visit. Your doctor will tell you how often to check your blood pressure based on your age, your blood pressure results, and other factors. · Prostate exam. Talk to your doctor about whether you should have a blood test (called a PSA test) for prostate cancer. Experts disagree on whether men should have this test. Some experts recommend that you discuss the benefits and risks of the test with your doctor. · Diabetes. Ask your doctor whether you should have tests for diabetes. · Vision. Some experts recommend that you have yearly exams for glaucoma and other age-related eye problems starting at age 48. · Hearing. Tell your doctor if you notice any change in your hearing. You can have tests to find out how well you hear. · Colon cancer. You should begin tests for colon cancer at age 48. You may have one of several tests. Your doctor will tell you how often to have tests based on your age and risk. Risks include whether you already had a precancerous polyp removed from your colon or whether your parent, brother, sister, or child has had colon cancer. · Heart attack and stroke risk. At least every 4 to 6 years, you should have your risk for heart attack and stroke assessed. Your doctor uses factors such as your age, blood pressure, cholesterol, and whether you smoke or have diabetes to show what your risk for a heart attack or stroke is over the next 10 years. · Abdominal aortic aneurysm. Ask your doctor whether you should have a test to check for an aneurysm. You may need a test if you ever smoked or if your parent, brother, sister, or child has had an aneurysm. When should you call for help? Watch closely for changes in your health, and be sure to contact your doctor if you have any problems or symptoms that concern you. Where can you learn more? Go to http://dani-adelita.info/.   Enter D486 in the search box to learn more about \"Well Visit, Men 48 to 72: Care Instructions. \"  Current as of: May 12, 2017  Content Version: 11.4  © 2062-6069 Healthwise, [employer]. Care instructions adapted under license by [employer] (which disclaims liability or warranty for this information). If you have questions about a medical condition or this instruction, always ask your healthcare professional. [address] any warranty or liability for your use of this information. WEIGHT LOSS RECOMMENDATIONS:    [facility] Medically Supervised Weight Loss Program:   - Multidisciplinary & holistic approach to your weight loss   - [phone]    [facility]:               - [address] [place], [place]   - [phone]   - [email or web address] com   - 3 month initial course   - Initial fee + monthly membership fee    [place] [facility]   - Dr Justin Kumar   - [address]. [place], [place]   - [phone]   - www. 500Friends     [facility] PREP Program (Physician Recommend Exercise Program   - $60 for 60 days      Weight Watchers:   - See website    Esteban Flies:   - See website    New Technology:   - My Tere Mingle or other Apps for your phone   - FitBit or FuelBand    Medications:   - Contrave (1 tab twice daily)   - Qsymia (1 tab daily)   - Belviq (1 tab daily)              - Saxenda (1 injection daily)      Aerobic exercise: goal of 3-5 times per week, about 30 minutes    Diet changes: limiting daily calorie intake to 2,000. Work on reading nutrition labels on food (in particular the serving size, the calories per serving, and carbohydrates). Work on decreasing portion sizes & snacking.

## 2018-01-29 NOTE — PROGRESS NOTES
Please call lab [facility] and have them add on A1c. Dx: elevated fasting blood sugar. All labs are stable or at goal except for 2nd elevated FBS and slight increase in PSA. Has f/u tomorrow.

## 2018-01-29 NOTE — PROGRESS NOTES
Christin Priest is a 61 y.o. male who was seen in clinic today (1/29/2018) for a full physical.      Assessment & Plan:   Diagnoses and all orders for this visit:    1. Routine physical examination       -     Previous labs reviewed & discussed with him     2. History of DVT (deep vein thrombosis)  -     rivaroxaban (XARELTO) 20 mg tab tablet; TAKE 1 TABLET BY MOUTH ONCE DAILY    3. Superficial thrombosis of right lower extremity  -     rivaroxaban (XARELTO) 20 mg tab tablet; TAKE 1 TABLET BY MOUTH ONCE DAILY    4. Obesity (BMI 30.0-34.9)- stable, needs improvement, I have reviewed/discussed the above normal BMI with the patient. I have recommended the following interventions: encourage exercise and lifestyle education regarding diet. 5. Pure hypercholesterolemia- well controlled, continue current treatment     6. Prediabetes- new dx, confirmed, will need A1c at next visit, reviewed diet changes & weight loss. Follow-up Disposition:  Return in about 1 year (around 1/29/2019) for FULL PHYSICAL - 30 minutes. ------------------------------------------------------------------------------------------    Subjective:   Merle Luciano is here today for a full physical.      Health Maintenance  Immunizations:     Influenza: up to date. Tetanus: up to date. Shingles: not up to date - reviewed Shingrix vaccine & will readdress at next visit. Pneumonia: up to date. Cancer screening:     Prostate: reviewed guidelines, will do today. Colon: guidelines reviewed, UTD. Patient Care Team:  Juan Darden MD as PCP - General (Internal Medicine)  Welby Homans, MD (Dermatology)  Slim Pedro MD (Dermatology)       The following sections were reviewed & updated as appropriate: PMH, PSH, FH, and SH.       Patient Active Problem List   Diagnosis Code    Hyperlipidemia E78.5    Squamous cell carcinoma in situ of skin T57.3    Umbilical hernia E14.3    Ventral hernia K43.9    Superficial thrombosis of leg I82.819    Obesity E66.9    Prediabetes R73.03    History of DVT (deep vein thrombosis) Z86.718          Prior to Admission medications    Medication Sig Start Date End Date Taking? Authorizing Provider   lovastatin (MEVACOR) 20 mg tablet TAKE ONE TABLET BY MOUTH NIGHTLY AT BEDTIME 1/11/18  Yes Wilver Marroquin MD   XARELTO 20 mg tab tablet TAKE 1 TABLET BY MOUTH ONCE DAILY 8/28/17  Yes Wilver Marroquin MD   Cetirizine (ZYRTEC) 10 mg Cap Take 1 capsule by mouth as needed. Yes Historical Provider   multivitamin capsule Take 1 capsule by mouth. Yes Historical Provider          Allergies   Allergen Reactions    Codeine Rash and Itching             Review of Systems   Constitutional: Negative for chills and fever. Respiratory: Negative for cough and shortness of breath. Cardiovascular: Negative for chest pain and palpitations. Gastrointestinal: Negative for abdominal pain, blood in stool, constipation, diarrhea, heartburn, nausea and vomiting. Genitourinary:        He reports: incomplete voiding on/off. He denies: nocturia, urinary hesitancy, urinary frequency, weak stream.       Reports trouble getting an erection or maintaining an erection. Musculoskeletal: Negative for joint pain and myalgias. Neurological: Negative for tingling, focal weakness and headaches. Endo/Heme/Allergies: Does not bruise/bleed easily. Psychiatric/Behavioral: Negative for depression. The patient is not nervous/anxious and does not have insomnia. Objective:   Physical Exam   Constitutional: He appears well-developed. No distress. obese   HENT:   Right Ear: Tympanic membrane, external ear and ear canal normal.   Left Ear: Tympanic membrane, external ear and ear canal normal.   Nose: Nose normal.   Mouth/Throat: Uvula is midline, oropharynx is clear and moist and mucous membranes are normal. No posterior oropharyngeal erythema.    Eyes: Conjunctivae and lids are normal. No scleral icterus. Neck: Neck supple. Carotid bruit is not present. No thyromegaly present. Cardiovascular: Regular rhythm and normal heart sounds. No murmur heard. Pulses:       Dorsalis pedis pulses are 2+ on the right side, and 2+ on the left side. Posterior tibial pulses are 2+ on the right side, and 2+ on the left side. Pulmonary/Chest: Effort normal and breath sounds normal. He has no wheezes. He has no rales. Abdominal: Soft. Bowel sounds are normal. He exhibits no mass. There is no hepatosplenomegaly. There is no tenderness. A hernia (umbilical, 3cm, easily reducible) is present. Genitourinary: Rectal exam shows no external hemorrhoid, no internal hemorrhoid, no mass, no tenderness and guaiac negative stool. Prostate is not enlarged and not tender. Musculoskeletal: He exhibits no edema. Cervical back: Normal.        Thoracic back: He exhibits no bony tenderness. Lumbar back: Normal.   Lymphadenopathy:     He has no cervical adenopathy. Neurological: He has normal strength. No cranial nerve deficit or sensory deficit. Skin: No rash noted. Psychiatric: He has a normal mood and affect. His behavior is normal.          Visit Vitals    /78    Pulse 76    Temp 98.2 °F (36.8 °C) (Oral)    Resp 16    Ht 5' 9.5\" (1.765 m)    Wt 225 lb (102.1 kg)    SpO2 96%    BMI 32.75 kg/m2          Advised him to call back or return to office if symptoms worsen/change/persist.  Discussed expected course/resolution/complications of diagnosis in detail with patient. Medication risks/benefits/costs/interactions/alternatives discussed with patient. He was given an after visit summary which includes diagnoses, current medications, & vitals. He expressed understanding with the diagnosis and plan. Aspects of this note may have been generated using voice recognition software. Despite editing, there may be some syntax errors.        Ning Macdonald MD

## 2018-01-31 LAB
HBA1C MFR BLD: 5.9 % (ref 4.8–5.6)
SPECIMEN STATUS REPORT, ROLRST: NORMAL

## 2018-01-31 NOTE — PROGRESS NOTES
Results released to patient via [facility]. Labs confirm pre-DM. Elevated A1c and FBS. Will defer meds and work on lifestyle changes.

## 2018-02-01 RX ORDER — LOVASTATIN 20 MG/1
TABLET ORAL
Qty: 90 TAB | Refills: 3 | Status: SHIPPED | OUTPATIENT
Start: 2018-02-01 | End: 2019-02-25 | Stop reason: SDUPTHER

## 2018-02-19 ENCOUNTER — OFFICE VISIT (OUTPATIENT)
Dept: DERMATOLOGY | Facility: AMBULATORY SURGERY CENTER | Age: 58
End: 2018-02-19

## 2018-02-19 VITALS
DIASTOLIC BLOOD PRESSURE: 84 MMHG | TEMPERATURE: 98.2 F | HEART RATE: 78 BPM | OXYGEN SATURATION: 98 % | RESPIRATION RATE: 16 BRPM | SYSTOLIC BLOOD PRESSURE: 132 MMHG

## 2018-02-19 DIAGNOSIS — C44.729 SQUAMOUS CELL CARCINOMA OF LEFT LOWER LEG: Primary | ICD-10-CM

## 2018-02-19 NOTE — MR AVS SNAPSHOT
[address] [facility] [address] 
[phone] Patient: Richie Arita 
MRN: [id number] :1960 Visit Information Date & Time Provider Department Dept. Phone Encounter #  
 2018  1:00 PM MD Travis More [phone] Upcoming Health Maintenance Date Due COLONOSCOPY 2021 DTaP/Tdap/Td series (3 - Td) 3/7/2026 Allergies as of 2018  Review Complete On: 2018 By: Daniella Britt Severity Noted Reaction Type Reactions Codeine  05/10/2011   Topical Rash, Itching Current Immunizations  Reviewed on 2018 Name Date Influenza Vaccine (Quad) PF 10/3/2017 TDAP Vaccine 2008 Tdap 3/7/2016 Not reviewed this visit You Were Diagnosed With   
  
 Codes Comments Squamous cell carcinoma of left lower leg    -  Primary ICD-10-CM: I84.694 ICD-9-CM: 173.72 Vitals BP Pulse Temp Resp SpO2 Smoking Status 132/84 78 98.2 °F (36.8 °C) 16 98% Never Smoker Preferred Pharmacy Pharmacy Name Phone [facility] [address] [phone] Your Updated Medication List  
  
   
This list is accurate as of: 18  2:49 PM.  Always use your most recent med list.  
  
  
  
  
 lovastatin 20 mg tablet Commonly known as:  MEVACOR  
TAKE ONE TABLET BY MOUTH NIGHTLY AT BEDTIME  
  
 multivitamin capsule Take 1 capsule by mouth.  
  
 rivaroxaban 20 mg Tab tablet Commonly known as:  [facility] [employer] TAKE 1 TABLET BY MOUTH ONCE DAILY ZyrTEC 10 mg Cap Generic drug:  Cetirizine Take 1 capsule by mouth as needed. Patient Instructions WOUND CARE INSTRUCTIONS 1. Keep the dressing clean and dry and do not remove for 48 hours. 2. Then change the dressing once a day as follows: 
a. Wash hands before and after each dressing change. b. Remove dressing and wash site gently with mild soap and water, rinse, and pat dry. 
c. Apply an ointment (Bacitracin, Polysporin, Neosporin, Petroleum jelly or Aquaphor). d. Apply a non-stick (Telfa) dressing or Band-Aid to cover the wound. Remove pressure bandage on wednesday. You may shower daily at this point, no bandage necessary. Glue will eventually come off within the next 2 weeks. If you still feel rough glue at this point, you may apply vaseline to site daily until fully removed. 3. Watch for: BLEEDING: A small amount of drainage may occur. If bleeding occurs, elevate and rest the surgery site. Apply gauze and steady pressure for 15 minutes. If bleeding continues, call this office. INFECTION: Signs of infection include increased redness, pain, warmth, drainage of pus, and fever. If this occurs, call this office. 4. Special Instructions (follow any that are checked): ·  You have stitches that DO/ DO NOT need to be removed. ·  Avoid bending at the waist and heavy lifting for two days. ·  Sleep with your head elevated for the next two nights. ·  Rest the surgery site and keep it elevated as much as possible for two days. ·  You may apply an ice-pack for 10-15 minutes every waking hour for the rest of the day. ·  Eat a soft diet and avoid hot food and hot drinks for the rest of the day. ·  Other instructions: Follow up as directed. Take Tylenol or Ibuprofen for pain as needed. Once the site is healed with no remaining bandages or open areas, protect your surgical site and scar from the sun, as this area will be more sensitive. Use a broad spectrum sunscreen SPF 30 or higher daily, and a chemical free product (one containing zinc oxide or titanium dioxide) is a good choice if the area is sensitive. You may begin to gently massage the surgical site in 2-3 weeks, rubbing in a circular motion along the scar.  This can help reduce swelling and thickness of a scar. A scar cream may be used beginnning 1 month after the surgery. If you have any questions or concerns, please call our office Monday through Friday at [phone]. Introducing [facility]! Dear Selma Jones: 
Thank you for requesting a [employer] account. Our records indicate that you already have an active [employer] account. You can access your account anytime at [email or web address] Did you know that you can access your hospital and ER discharge instructions at any time in [employer]? You can also review all of your test results from your hospital stay or ER visit. Additional Information If you have questions, please visit the Frequently Asked Questions section of the [employer] website at [email or web address] Remember, [employer] is NOT to be used for urgent needs. For medical emergencies, dial 911. Now available from your iPhone and Android! Please provide this summary of care documentation to your next provider. Your primary care clinician is listed as Martha Jimenes. If you have any questions after today's visit, please call [phone].

## 2018-02-19 NOTE — PROGRESS NOTES
This note is written by Aida Milligan, as dictated by Jayy Amaro. Magdy Tesfaye MD.    CC: Squamous cell carcinoma on the left anterior lower leg     History of present illness:     Christin Priest is a 61 y.o. male referred by Dr. Mitzy Antoine. He has a biopsy-proven well differentiated squamous cell carcinoma on the left anterior lower leg. This is a new squamous cell carcinoma present for a few months described as a lesion that behaved similar to his other skin cancers with no prior treatment. Biopsy confirmed the diagnosis of squamous cell carcinoma, and I reviewed the written pathology report. He is feeling well and in his usual state of health today. He has no pain, no current illnesses, no other skin concerns. His allergies, medications, medical, and social history are reviewed by me today. I treated a squamous cell carcinoma on his right posterior leg on 10/24/17. He has healed well without evidence of lesion recurrence. He takes Xarelto due to DVT. Exam:     He is an awake, alert, and oriented 61 y.o. male who appears well and in no distress. There is no preauricular, submandibular, or cervical lymphadenopathy. I examined his left anterior lower leg. He has a 12 x 10 mm healing biopsy site on his left anterior lower leg. He confirms location. Assessment/plan:    1. Squamous cell carcinoma, left anterior lower leg. I discussed the diagnosis of squamous cell carcinoma and summarized the pathology report. Mohs surgery is indicated by size. The procedure was discussed, verbal and written consent were obtained. I performed the procedure. Two stages were required to reach a tumor free plane. The surgical defect was managed with intermediate repair. There were no complications. He will follow up as needed as the site heals. Indications, risks, and options were discussed with Christin Priest preoperatively.  Risks including, but not limited to: pain, bleeding, infection, tumor recurrence, scarring and damage to motor and/or sensory nerves, were discussed. Charles Lacey chose Mohs surgery. Charles Lacey was an acceptable surgery candidate. Charles Lacey was placed in the appropriate position on the operating table in the Mohs surgery procedure room. The area was prepped and draped in the standard manner. Gentian violet was used to outline the clinical margins of the tumor. Local anesthesia was then obtained. The grossly visible tumor was then removed, an underlying layer was excised and mapped according to the Mohs technique, and the individual specimens examined microscopically. The process was repeated until microscopic examination of the tissue specimens confirmed a tumor-free plane. Hemostasis was obtained with electrosurgery and pressure. The wound was covered between stages with moist saline gauze. The wound management options of second intent healing, layered closure, local flap, and/or full thickness skin graft were discussed. Charles Lacey understands the aims, risks, alternatives, and possible complications and elects to proceed with an intermediate layered closure. Wound margins were made vertical, edges undermined in the subcutaneous plane, standing cones corrected at both poles followed by layered closure. The wound was closed with buried 4-0 polysorb suture in the subcutis to reduce tension on the skin edges, and skin edges were approximated with 4-0 polysorb suture in the dermis to reduce tension on the epidermis. The final closure length was 35 mm. The wound was bandaged with skin glue, Telfa, gauze, Coverroll, and Coban. Wound care instructions (written and verbal) and a follow up appointment were given to Charles Lacey before discharge. Charles Lacey was discharged in good condition. 2. History of nonmelanoma skin cancer. I discussed the diagnosis and recommend routine examinations with Dr. Joelle Ferris for surveillance.     The documentation recorded by the scribe accurately reflects the service I personally performed and the decisions made by me. [facility]   OFFICE PROCEDURE PROGRESS NOTE     Chart reviewed for the following:     Bill Higgins MD, have reviewed the History, Physical and updated the Allergic reactions for [address] performed immediately prior to start of procedure:     Bill Higgins MD, have performed the following reviews on Parnell Breath prior to the start of the procedure:     * Patient was identified by name and date of birth   * Agreement on procedure being performed was verified   * Risks and Benefits explained to the patient   * Procedure site verified and marked as necessary   * Patient was positioned for comfort   * Consent was signed and verified     Time: 1:12 PM   Date of procedure: 2/19/2018  Procedure performed by: Rip Fernandez.  Billy Higgins MD   Provider assisted by: LPN   Patient assisted by: self   How tolerated by patient: tolerated the procedure well with no complications   Comments: none

## 2018-02-19 NOTE — PATIENT INSTRUCTIONS
WOUND CARE INSTRUCTIONS    1. Keep the dressing clean and dry and do not remove for 48 hours. 2. Then change the dressing once a day as follows:  a. Wash hands before and after each dressing change. b. Remove dressing and wash site gently with mild soap and water, rinse, and pat dry.  c. Apply an ointment (Bacitracin, Polysporin, Neosporin, Petroleum jelly or Aquaphor). d. Apply a non-stick (Telfa) dressing or Band-Aid to cover the wound. Remove pressure bandage on wednesday. You may shower daily at this point, no bandage necessary. Glue will eventually come off within the next 2 weeks. If you still feel rough glue at this point, you may apply vaseline to site daily until fully removed. 3. Watch for:  BLEEDING: A small amount of drainage may occur. If bleeding occurs, elevate and rest the surgery site. Apply gauze and steady pressure for 15 minutes. If bleeding continues, call this office. INFECTION: Signs of infection include increased redness, pain, warmth, drainage of pus, and fever. If this occurs, call this office. 4. Special Instructions (follow any that are checked):  · [] You have stitches that DO/ DO NOT need to be removed. · [] Avoid bending at the waist and heavy lifting for two days. · [] Sleep with your head elevated for the next two nights. · [] Rest the surgery site and keep it elevated as much as possible for two days. · [] You may apply an ice-pack for 10-15 minutes every waking hour for the rest of the day. · [] Eat a soft diet and avoid hot food and hot drinks for the rest of the day. · [] Other instructions: Follow up as directed. Take Tylenol or Ibuprofen for pain as needed. Once the site is healed with no remaining bandages or open areas, protect your surgical site and scar from the sun, as this area will be more sensitive.   Use a broad spectrum sunscreen SPF 30 or higher daily, and a chemical free product (one containing zinc oxide or titanium dioxide) is a good choice if the area is sensitive. You may begin to gently massage the surgical site in 2-3 weeks, rubbing in a circular motion along the scar. This can help reduce swelling and thickness of a scar. A scar cream may be used beginnning 1 month after the surgery. If you have any questions or concerns, please call our office Monday through Friday at [phone].

## 2018-08-22 ENCOUNTER — DOCUMENTATION ONLY (OUTPATIENT)
Dept: INTERNAL MEDICINE CLINIC | Age: 58
End: 2018-08-22

## 2018-08-22 ENCOUNTER — TELEPHONE (OUTPATIENT)
Dept: INTERNAL MEDICINE CLINIC | Age: 58
End: 2018-08-22

## 2019-02-09 DIAGNOSIS — I82.811 SUPERFICIAL THROMBOSIS OF RIGHT LOWER EXTREMITY: ICD-10-CM

## 2019-02-09 DIAGNOSIS — Z86.718 HISTORY OF DVT (DEEP VEIN THROMBOSIS): ICD-10-CM

## 2019-02-11 DIAGNOSIS — Z86.718 HISTORY OF DVT (DEEP VEIN THROMBOSIS): ICD-10-CM

## 2019-02-11 DIAGNOSIS — I82.811 SUPERFICIAL THROMBOSIS OF RIGHT LOWER EXTREMITY: ICD-10-CM

## 2019-02-11 NOTE — TELEPHONE ENCOUNTER
Nel Case ([facility]) [phone]     Refill on xarelto to [facility]

## 2019-02-25 ENCOUNTER — OFFICE VISIT (OUTPATIENT)
Dept: INTERNAL MEDICINE CLINIC | Age: 59
End: 2019-02-25

## 2019-02-25 VITALS
RESPIRATION RATE: 16 BRPM | DIASTOLIC BLOOD PRESSURE: 78 MMHG | HEART RATE: 78 BPM | OXYGEN SATURATION: 96 % | BODY MASS INDEX: 33.36 KG/M2 | SYSTOLIC BLOOD PRESSURE: 106 MMHG | HEIGHT: 70 IN | WEIGHT: 233 LBS | TEMPERATURE: 98.5 F

## 2019-02-25 DIAGNOSIS — Z86.718 HISTORY OF DVT (DEEP VEIN THROMBOSIS): ICD-10-CM

## 2019-02-25 DIAGNOSIS — E66.9 OBESITY (BMI 30.0-34.9): ICD-10-CM

## 2019-02-25 DIAGNOSIS — E78.00 PURE HYPERCHOLESTEROLEMIA: ICD-10-CM

## 2019-02-25 DIAGNOSIS — G89.29 CHRONIC PAIN OF RIGHT KNEE: ICD-10-CM

## 2019-02-25 DIAGNOSIS — Z23 ENCOUNTER FOR IMMUNIZATION: ICD-10-CM

## 2019-02-25 DIAGNOSIS — R73.03 PREDIABETES: ICD-10-CM

## 2019-02-25 DIAGNOSIS — Z00.00 ROUTINE PHYSICAL EXAMINATION: Primary | ICD-10-CM

## 2019-02-25 DIAGNOSIS — I82.811 SUPERFICIAL THROMBOSIS OF RIGHT LOWER EXTREMITY: ICD-10-CM

## 2019-02-25 DIAGNOSIS — Z71.89 ACP (ADVANCE CARE PLANNING): ICD-10-CM

## 2019-02-25 DIAGNOSIS — M25.561 CHRONIC PAIN OF RIGHT KNEE: ICD-10-CM

## 2019-02-25 RX ORDER — LOVASTATIN 20 MG/1
TABLET ORAL
Qty: 90 TAB | Refills: 3 | Status: SHIPPED | OUTPATIENT
Start: 2019-02-25 | End: 2020-05-17

## 2019-02-25 NOTE — ACP (ADVANCE CARE PLANNING)
Advance Care Planning (ACP) Provider Note - Comprehensive     Date of ACP Conversation: 02/25/19  Persons included in Conversation:  patient  Length of ACP Conversation in minutes: <16 minutes (Non-Billable)    Authorized Decision Maker (if patient is incapable of making informed decisions): Other Legally Authorized Decision Maker (e.g. Next of Kin)      He has NO advanced directive  - add't info provided. Reviewed DNR/DNI and patient is not interested. General ACP for ALL Patients with Decision Making Capacity:  Importance of advance care planning, including choosing a healthcare agent to communicate patient's healthcare decisions if patient lost the ability to make decisions, such as after a sudden illness or accident  Understanding of the healthcare agent role was assessed and information provided  Opportunity offered to explore how cultural, Lutheran, spiritual, or personal beliefs would affect decisions for future care  Exploration of values, goals, and preferences if recovery is not expected, even with continued medical treatment in the event of: Imminent death or severe, permanent brain injury    For Serious or Chronic Illness:  Understanding of CPR, goals and expected outcomes, benefits and burdens discussed.   Understanding of medical condition  Information on CPR success rates provided (e.g. for CPR in hospital, survival to d/c at two weeks is 22%, for chronically ill or elderly/frail survival is less than 3%)    Interventions Provided:  Recommended communicating the plan and making copies for the healthcare agent, personal physician, and others as appropriate (e.g., health system)  Recommended review of completed ACP document annually or upon change in health status

## 2019-02-25 NOTE — PROGRESS NOTES
Srinivas Gil is a 60 y.o. male who was seen in clinic today (2/25/2019) for a full physical.     
 
Assessment & Plan:  
Diagnoses and all orders for this visit: 1. Routine physical examination -     METABOLIC PANEL, COMPREHENSIVE 
-     CBC W/O DIFF 
-     LIPID PANEL 
-     HEMOGLOBIN A1C WITH EAG 
-     PSA, DIAGNOSTIC (PROSTATE SPECIFIC AG) 2. ACP (advance care planning) -     FULL CODE 
 
3. Encounter for immunization 
-     varicella-zoster recombinant, PF, (SHINGRIX, PF,) 50 mcg/0.5 mL susr injection; 0.5 ml IM once and then repeat in 2-6 months. 4. History of DVT (deep vein thrombosis)- unprovoked DVT but w/ multiple family members w/ hypercoagulable states and recurrent superficial thrombosis, have decided to remain on meds, reviewed when to consider testing or seeing specialist, will continue current meds at this time 
-     rivaroxaban (XARELTO) 20 mg tab tablet; TAKE 1 TABLET BY MOUTH ONCE DAILY 5. Superficial thrombosis of right lower extremity- no recurrence, see above 
-     rivaroxaban (XARELTO) 20 mg tab tablet; TAKE 1 TABLET BY MOUTH ONCE DAILY 6. Pure hypercholesterolemia- previously well controlled, continue current treatment pending review of labs, as he is taking his medication(s) as directed & without any side effects.  
-     lovastatin (MEVACOR) 20 mg tablet; TAKE ONE TABLET BY MOUTH NIGHTLY AT BEDTIME 7. Prediabetes- had been stable but curious to see w/ weight gain, reviewed role of diet & weight w/ sugar control, and long term effects of uncontrolled sugar. 8. Obesity (BMI 30.0-34.9)- stable, poorly controlled, needs improvement, I have reviewed/discussed the above normal BMI with the patient. I have recommended the following interventions: encourage exercise and lifestyle education regarding diet.   
 
9. Chronic pain of right knee- this is a new problem, symptoms are: stable, differential dx reviewed with the patient, favor a meniscus etiology more then OA or ligament. Will treat with: PT, ice, rest, stretching. Red flags were reviewed with the patient to RTC or notify me, expected time course for resolution reviewed. Reviewed when to consider imaging or seeing surgeon.  
-     REFERRAL TO PHYSICAL THERAPY Follow-up Disposition: 
Return in about 1 year (around 2/25/2020) for FULL PHYSICAL - 30 minutes. ------------------------------------------------------------------------------------------ Subjective:  
Scott Bailon is here today for a full physical.   
 
End of Life Planning This was discussed with him today and he has NO advanced directive  - add't info provided. Reviewed DNR/DNI and patient is not interested. Health Maintenance Immunizations:  
  Influenza: declined. Tetanus: up to date. Shingles: due for Shingrix - script provided. Pneumonia: n/a. Cancer screening:   
 Prostate: guidelines reviewed, will do today. Colon: guidelines reviewed, up to date. Patient Care Team: 
Edelmira Villarreal MD as PCP - General (Internal Medicine) Sita Pinedo MD (Dermatology) Jerri Samaniego MD (Dermatology) The following sections were reviewed & updated as appropriate: PMH, PSH, FH, and SH. Patient Active Problem List  
Diagnosis Code  Hyperlipidemia E78.5  Squamous cell carcinoma in situ of skin D04.9  Umbilical hernia Z47.8  Ventral hernia K43.9  Superficial thrombosis of leg I82.819  Obesity (BMI 30.0-34. 9) E66.9  Prediabetes R73.03  
 History of DVT (deep vein thrombosis) Z86.718 Prior to Admission medications Medication Sig Start Date End Date Taking?  Authorizing Provider  
rivaroxaban (XARELTO) 20 mg tab tablet TAKE 1 TABLET BY MOUTH ONCE DAILY 2/9/19  Yes Edelmira Villarreal MD  
lovastatin (MEVACOR) 20 mg tablet TAKE ONE TABLET BY MOUTH NIGHTLY AT BEDTIME 2/1/18  Yes Edelmira Villarreal MD  
 Cetirizine (ZYRTEC) 10 mg Cap Take 1 capsule by mouth as needed. Yes Provider, Historical  
multivitamin capsule Take 1 capsule by mouth. Yes Provider, Historical  
  
 
 
Allergies Allergen Reactions  Codeine Rash and Itching Review of Systems Constitutional: Negative for chills and fever. Respiratory: Negative for cough and shortness of breath. Cardiovascular: Negative for chest pain and palpitations. Gastrointestinal: Negative for abdominal pain, blood in stool, constipation, diarrhea, heartburn, nausea and vomiting. Genitourinary: He reports: urinary hesitancy, weak stream.  He denies: nocturia x 1. Denies trouble getting or maintaining an erection. Denies trouble with AM erections. Musculoskeletal: Positive for joint pain (r knee, pain w/ exercising, clicking w/ walking, no locking up or giving out). Negative for myalgias. Neurological: Negative for tingling, focal weakness and headaches. Endo/Heme/Allergies: Does not bruise/bleed easily. Psychiatric/Behavioral: Negative for depression. The patient is not nervous/anxious and does not have insomnia. Objective:  
Physical Exam  
Constitutional: He appears well-developed. No distress. obese HENT:  
Right Ear: Tympanic membrane, external ear and ear canal normal.  
Left Ear: Tympanic membrane, external ear and ear canal normal.  
Nose: Nose normal.  
Mouth/Throat: Uvula is midline, oropharynx is clear and moist and mucous membranes are normal. No posterior oropharyngeal erythema. Eyes: Conjunctivae and lids are normal. No scleral icterus. Neck: Neck supple. Carotid bruit is not present. No thyromegaly present. Cardiovascular: Regular rhythm and normal heart sounds. No murmur heard. Pulses: 
     Dorsalis pedis pulses are 2+ on the right side, and 2+ on the left side. Posterior tibial pulses are 2+ on the right side, and 2+ on the left side. Pulmonary/Chest: Effort normal and breath sounds normal. He has no wheezes. He has no rales. Abdominal: Soft. Bowel sounds are normal. He exhibits no mass. There is no hepatosplenomegaly. There is no tenderness. Musculoskeletal: He exhibits no edema. Right knee: He exhibits normal range of motion, no swelling and no deformity. No tenderness found. Cervical back: Normal.  
     Thoracic back: He exhibits no bony tenderness. Lumbar back: Normal.  
Right knee: stable to testing. There is some clicking w/ lateral movement Lymphadenopathy:  
  He has no cervical adenopathy. Neurological: He has normal strength. No sensory deficit. Skin: No rash noted. Psychiatric: He has a normal mood and affect. His behavior is normal.  
  
 
 
Visit Vitals /78 Pulse 78 Temp 98.5 °F (36.9 °C) (Oral) Resp 16 Ht 5' 9.8\" (1.773 m) Wt 233 lb (105.7 kg) SpO2 96% BMI 33.62 kg/m² Advised him to call back or return to office if symptoms worsen/change/persist. 
Discussed expected course/resolution/complications of diagnosis in detail with patient. Medication risks/benefits/costs/interactions/alternatives discussed with patient. He was given an after visit summary which includes diagnoses, current medications, & vitals. He expressed understanding with the diagnosis and plan. Aspects of this note may have been generated using voice recognition software. Despite editing, there may be some syntax errors.   
 
 
Tracy Schwartz MD

## 2019-02-25 NOTE — PATIENT INSTRUCTIONS
Well Visit, Men 48 to 72: Care Instructions Your Care Instructions Physical exams can help you stay healthy. Your doctor has checked your overall health and may have suggested ways to take good care of yourself. He or she also may have recommended tests. At home, you can help prevent illness with healthy eating, regular exercise, and other steps. Follow-up care is a key part of your treatment and safety. Be sure to make and go to all appointments, and call your doctor if you are having problems. It's also a good idea to know your test results and keep a list of the medicines you take. How can you care for yourself at home? · Reach and stay at a healthy weight. This will lower your risk for many problems, such as obesity, diabetes, heart disease, and high blood pressure. · Get at least 30 minutes of exercise on most days of the week. Walking is a good choice. You also may want to do other activities, such as running, swimming, cycling, or playing tennis or team sports. · Do not smoke. Smoking can make health problems worse. If you need help quitting, talk to your doctor about stop-smoking programs and medicines. These can increase your chances of quitting for good. · Protect your skin from too much sun. When you're outdoors from 10 a.m. to 4 p.m., stay in the shade or cover up with clothing and a hat with a wide brim. Wear sunglasses that block UV rays. Even when it's cloudy, put broad-spectrum sunscreen (SPF 30 or higher) on any exposed skin. · See a dentist one or two times a year for checkups and to have your teeth cleaned. · Wear a seat belt in the car. · Limit alcohol to 2 drinks a day. Too much alcohol can cause health problems. Follow your doctor's advice about when to have certain tests. These tests can spot problems early. · Cholesterol.  Your doctor will tell you how often to have this done based on your overall health and other things that can increase your risk for heart attack and stroke. · Blood pressure. Have your blood pressure checked during a routine doctor visit. Your doctor will tell you how often to check your blood pressure based on your age, your blood pressure results, and other factors. · Prostate exam. Talk to your doctor about whether you should have a blood test (called a PSA test) for prostate cancer. Experts disagree on whether men should have this test. Some experts recommend that you discuss the benefits and risks of the test with your doctor. · Diabetes. Ask your doctor whether you should have tests for diabetes. · Vision. Some experts recommend that you have yearly exams for glaucoma and other age-related eye problems starting at age 48. · Hearing. Tell your doctor if you notice any change in your hearing. You can have tests to find out how well you hear. · Colon cancer. You should begin tests for colon cancer at age 48. You may have one of several tests. Your doctor will tell you how often to have tests based on your age and risk. Risks include whether you already had a precancerous polyp removed from your colon or whether your parent, brother, sister, or child has had colon cancer. · Heart attack and stroke risk. At least every 4 to 6 years, you should have your risk for heart attack and stroke assessed. Your doctor uses factors such as your age, blood pressure, cholesterol, and whether you smoke or have diabetes to show what your risk for a heart attack or stroke is over the next 10 years. · Abdominal aortic aneurysm. Ask your doctor whether you should have a test to check for an aneurysm. You may need a test if you ever smoked or if your parent, brother, sister, or child has had an aneurysm. When should you call for help? Watch closely for changes in your health, and be sure to contact your doctor if you have any problems or symptoms that concern you. Where can you learn more? Go to http://dani-adelita.info/. Enter Y479 in the search box to learn more about \"Well Visit, Men 48 to 72: Care Instructions. \" Current as of: March 28, 2018 Content Version: 11.9 © 4863-4276 [employer]. Care instructions adapted under license by [employer] (which disclaims liability or warranty for this information). If you have questions about a medical condition or this instruction, always ask your healthcare professional. [place] [address] any warranty or liability for your use of this information. PHYSICAL THERAPY [address] [place], [address] [facility] 
 [phone] [facility] [address], [address] [facility] 
 [phone] Physical Therapy & Sports Performance at [facility] [place] [address] [place], [address] 
 [phone] [facility] (pre[facility] Multiple locations [facility] [address] [facility], [address] 
 [phone] [facility] [address] [place] [address] 
 [phone] 
 [address] [facility] 
 [phone] [facility] [address] [place] 
 [phone] [facility] [address] [facility] 
 [phone] [facility] Multiple Locations  
 [phone]

## 2019-03-04 ENCOUNTER — TELEPHONE (OUTPATIENT)
Dept: INTERNAL MEDICINE CLINIC | Age: 59
End: 2019-03-04

## 2019-03-04 NOTE — TELEPHONE ENCOUNTER
Patient would like a call regarding a list of places for pt. Pt was recommended by dr Jennifer Herrera.     [phone]

## 2019-03-06 LAB
ALBUMIN SERPL-MCNC: 4.6 G/DL (ref 3.5–5.5)
ALBUMIN/GLOB SERPL: 2.1 {RATIO} (ref 1.2–2.2)
ALP SERPL-CCNC: 61 IU/L (ref 39–117)
ALT SERPL-CCNC: 30 IU/L (ref 0–44)
AST SERPL-CCNC: 20 IU/L (ref 0–40)
BILIRUB SERPL-MCNC: 0.6 MG/DL (ref 0–1.2)
BUN SERPL-MCNC: 15 MG/DL (ref 6–24)
BUN/CREAT SERPL: 14 (ref 9–20)
CALCIUM SERPL-MCNC: 9.5 MG/DL (ref 8.7–10.2)
CHLORIDE SERPL-SCNC: 105 MMOL/L (ref 96–106)
CHOLEST SERPL-MCNC: 154 MG/DL (ref 100–199)
CO2 SERPL-SCNC: 24 MMOL/L (ref 20–29)
CREAT SERPL-MCNC: 1.08 MG/DL (ref 0.76–1.27)
ERYTHROCYTE [DISTWIDTH] IN BLOOD BY AUTOMATED COUNT: 14.5 % (ref 12.3–15.4)
EST. AVERAGE GLUCOSE BLD GHB EST-MCNC: 126 MG/DL
GLOBULIN SER CALC-MCNC: 2.2 G/DL (ref 1.5–4.5)
GLUCOSE SERPL-MCNC: 97 MG/DL (ref 65–99)
HBA1C MFR BLD: 6 % (ref 4.8–5.6)
HCT VFR BLD AUTO: 45.1 % (ref 37.5–51)
HDLC SERPL-MCNC: 63 MG/DL
HGB BLD-MCNC: 15.5 G/DL (ref 13–17.7)
LDLC SERPL CALC-MCNC: 79 MG/DL (ref 0–99)
MCH RBC QN AUTO: 29.6 PG (ref 26.6–33)
MCHC RBC AUTO-ENTMCNC: 34.4 G/DL (ref 31.5–35.7)
MCV RBC AUTO: 86 FL (ref 79–97)
PLATELET # BLD AUTO: 184 X10E3/UL (ref 150–379)
POTASSIUM SERPL-SCNC: 4.6 MMOL/L (ref 3.5–5.2)
PROT SERPL-MCNC: 6.8 G/DL (ref 6–8.5)
PSA SERPL-MCNC: 1.1 NG/ML (ref 0–4)
RBC # BLD AUTO: 5.23 X10E6/UL (ref 4.14–5.8)
SODIUM SERPL-SCNC: 141 MMOL/L (ref 134–144)
TRIGL SERPL-MCNC: 62 MG/DL (ref 0–149)
VLDLC SERPL CALC-MCNC: 12 MG/DL (ref 5–40)
WBC # BLD AUTO: 4.2 X10E3/UL (ref 3.4–10.8)

## 2019-03-06 NOTE — PROGRESS NOTES
Results released to patient via [employer]. All labs are stable or at goal for him.   FBS normal, but A1c abnormal.  Lifestyle changes

## 2019-05-09 DIAGNOSIS — Z86.718 HISTORY OF DVT (DEEP VEIN THROMBOSIS): ICD-10-CM

## 2019-05-09 DIAGNOSIS — I82.811 SUPERFICIAL THROMBOSIS OF RIGHT LOWER EXTREMITY: ICD-10-CM

## 2019-09-17 ENCOUNTER — TELEPHONE (OUTPATIENT)
Dept: INTERNAL MEDICINE CLINIC | Age: 59
End: 2019-09-17

## 2019-09-17 NOTE — TELEPHONE ENCOUNTER
Return call to patient. LM need waist circumference, to please call or send [employer] message.

## 2019-09-18 ENCOUNTER — TELEPHONE (OUTPATIENT)
Dept: INTERNAL MEDICINE CLINIC | Age: 59
End: 2019-09-18

## 2019-09-18 NOTE — TELEPHONE ENCOUNTER
Attempted to reach patient via phone, unsuccessful, unable to leave message, mailbox full. Sent [facility].

## 2019-11-19 ENCOUNTER — TELEPHONE (OUTPATIENT)
Dept: INTERNAL MEDICINE CLINIC | Age: 59
End: 2019-11-19

## 2019-11-19 NOTE — TELEPHONE ENCOUNTER
Patient called and stated that he has been having some swelling and pain in the back of his knee. Patient also stated that he has been driving a lot and has blood clots in the past. There were no appointments available today and was scheduled with NP Thrift tomorrow. Let patient know that if there are any changes and he started to feel worse he should go to the ER.

## 2019-11-20 ENCOUNTER — OFFICE VISIT (OUTPATIENT)
Dept: INTERNAL MEDICINE CLINIC | Age: 59
End: 2019-11-20

## 2019-11-20 VITALS
SYSTOLIC BLOOD PRESSURE: 110 MMHG | HEART RATE: 78 BPM | TEMPERATURE: 98.7 F | RESPIRATION RATE: 16 BRPM | WEIGHT: 231.6 LBS | OXYGEN SATURATION: 96 % | HEIGHT: 70 IN | BODY MASS INDEX: 33.16 KG/M2 | DIASTOLIC BLOOD PRESSURE: 70 MMHG

## 2019-11-20 DIAGNOSIS — M79.604 RIGHT LEG PAIN: ICD-10-CM

## 2019-11-20 DIAGNOSIS — Z86.718 HISTORY OF DVT (DEEP VEIN THROMBOSIS): ICD-10-CM

## 2019-11-20 DIAGNOSIS — Z23 ENCOUNTER FOR IMMUNIZATION: ICD-10-CM

## 2019-11-20 DIAGNOSIS — I83.811 VARICOSE VEINS OF LEG WITH PAIN, RIGHT: Primary | ICD-10-CM

## 2019-11-20 NOTE — PROGRESS NOTES
Chief Complaint   Patient presents with    Leg Pain     stiffness     Reviewed record in preparation for visit and have obtained necessary documentation. Identified pt with two pt identifiers(name and ). Health Maintenance Due   Topic    Shingrix Vaccine Age 49> (1 of 2)    Influenza Age 5 to Adult          Chief Complaint   Patient presents with    Leg Pain     stiffness        Wt Readings from Last 3 Encounters:   19 231 lb 9.6 oz (105.1 kg)   19 233 lb (105.7 kg)   18 225 lb (102.1 kg)     Temp Readings from Last 3 Encounters:   19 98.7 °F (37.1 °C) (Oral)   19 98.5 °F (36.9 °C) (Oral)   18 98.2 °F (36.8 °C)     BP Readings from Last 3 Encounters:   19 110/70   19 106/78   18 132/84     Pulse Readings from Last 3 Encounters:   19 78   19 78   18 78           Learning Assessment:  :     Learning Assessment 2/15/2017 2016 2016 3/5/2014   PRIMARY LEARNER Patient Patient Patient Patient   HIGHEST LEVEL OF EDUCATION - PRIMARY LEARNER  - - - SOME COLLEGE   BARRIERS PRIMARY LEARNER NONE - NONE NONE     - - - NONE   CO-LEARNER CAREGIVER No - No No   PRIMARY LANGUAGE ENGLISH ENGLISH ENGLISH ENGLISH   LEARNER PREFERENCE PRIMARY DEMONSTRATION LISTENING DEMONSTRATION LISTENING   LEARNING SPECIAL TOPICS no no no -   ANSWERED BY patient patient patient Adron Cheadle   RELATIONSHIP SELF SELF SELF SELF   ASSESSMENT COMMENT none none none -       Depression Screening:  :     3 most recent PHQ Screens 2019   Little interest or pleasure in doing things Not at all   Feeling down, depressed, irritable, or hopeless Not at all   Total Score PHQ 2 0       Fall Risk Assessment:  :     Fall Risk Assessment, last 12 mths 2019   Able to walk? Yes   Fall in past 12 months? No       Abuse Screening:  :     Abuse Screening Questionnaire 2019   Do you ever feel afraid of your partner?  N N   Are you in a relationship with someone who physically or mentally threatens you? N N   Is it safe for you to go home? Y Y       Coordination of Care Questionnaire:  :     1) Have you been to an emergency room, urgent care clinic since your last visit? no   Hospitalized since your last visit? no             2) Have you seen or consulted any other health care providers outside of [address] [place] [address] since your last visit? no  (Include any pap smears or colon screenings in this section.)    3) Do you have an Advance Directive on file? no    4) Are you interested in receiving information on Advance Directives? NO      Patient is accompanied by self I have received verbal consent from Salvatore Egan to discuss any/all medical information while they are present in the room. Reviewed record  In preparation for visit and have obtained necessary documentation.

## 2019-11-20 NOTE — PROGRESS NOTES
HISTORY OF PRESENT ILLNESS  Purvi Montes is a 60 y.o. male. Patient reports sudden onset of cramping, pain, and stiffness of posterior right thigh radiating to calf that started 3 days ago. It is improved today. No swelling, redness, or warmth. Patient has history of DVT of right lower extremity. He is on Xarelto. Patient has severe varicose veins of right lower extremity. Patient states he drove almost 3 hours to [place] [place] and noticed the pain when he went to stand. It was severe throughout the day and he had difficulty straightening his leg. He had also driven a lot the day before. He has not taken anything for the pain. He felt he was well-hydrated at the time. Visit Vitals  /70 (BP 1 Location: Left arm, BP Patient Position: Sitting)   Pulse 78   Temp 98.7 °F (37.1 °C) (Oral)   Resp 16   Ht 5' 9.8\" (1.773 m)   Wt 231 lb 9.6 oz (105.1 kg)   SpO2 96%   BMI 33.42 kg/m²       HPI    Review of Systems   Musculoskeletal:        Right leg pain       Physical Exam  Musculoskeletal:      Right upper leg: He exhibits no tenderness (non-tender during exam, no swelling, redness; skin is warm to the touch with strong equal pedal pulses bilaterally; no calf or thigh pain). Comments: Neg sohail's sign; scattered bulging varicose veins of right lower extremity, worse behind right knee with minimal pain         ASSESSMENT and PLAN    ICD-10-CM ICD-9-CM    1. Varicose veins of leg with pain, right I83.811 454.8 DUPLEX LOWER EXT VENOUS RIGHT      REFERRAL TO VASCULAR SURGERY   2. Encounter for immunization Z23 V03.89 INFLUENZA VIRUS VAC QUAD,SPLIT,PRESV FREE SYRINGE IM      IA IMMUNIZ ADMIN,1 SINGLE/COMB VAC/TOXOID   3. Right leg pain M79.604 729.5 DUPLEX LOWER EXT VENOUS RIGHT   4.  History of DVT (deep vein thrombosis) Z86.718 V12.51 DUPLEX LOWER EXT VENOUS RIGHT     Orders Placed This Encounter    Influenza virus vaccine (QUADRIVALENT PRES FREE SYRINGE) IM (26655)   [facility] likely muscular but will doppler as rule out  May try warm compresses and light stretching

## 2019-11-20 NOTE — PATIENT INSTRUCTIONS
Vaccine Information Statement    Influenza (Flu) Vaccine (Inactivated or Recombinant): What You Need to Know    Many Vaccine Information Statements are available in Serbian and other languages. See www.immunize.org/vis  Hojas de información sobre vacunas están disponibles en español y en muchos otros idiomas. Visite www.immunize.org/vis    1. Why get vaccinated? Influenza vaccine can prevent influenza (flu). Flu is a contagious disease that spreads around the [facility] every year, usually between October and May. Anyone can get the flu, but it is more dangerous for some people. Infants and young children, people 72years of age and older, pregnant women, and people with certain health conditions or a weakened immune system are at greatest risk of flu complications. Pneumonia, bronchitis, sinus infections and ear infections are examples of flu-related complications. If you have a medical condition, such as heart disease, cancer or diabetes, flu can make it worse. Flu can cause fever and chills, sore throat, muscle aches, fatigue, cough, headache, and runny or stuffy nose. Some people may have vomiting and diarrhea, though this is more common in children than adults. Each year thousands of people in the [facility] die from flu, and many more are hospitalized. Flu vaccine prevents millions of illnesses and flu-related visits to the doctor each year. 2. Influenza vaccines     CDC recommends everyone 10months of age and older get vaccinated every flu season. Children 6 months through 6years of age may need 2 doses during a single flu season. Everyone else needs only 1 dose each flu season. It takes about 2 weeks for protection to develop after vaccination. There are many flu viruses, and they are always changing. Each year a new flu vaccine is made to protect against three or four viruses that are likely to cause disease in the upcoming flu season.  Even when the vaccine doesnt exactly match these viruses, it may still provide some protection. Influenza vaccine does not cause flu. Influenza vaccine may be given at the same time as other vaccines. 3. Talk with your health care provider    Tell your vaccine provider if the person getting the vaccine:   Has had an allergic reaction after a previous dose of influenza vaccine, or has any severe, life-threatening allergies.  Has ever had Guillain-Barré Syndrome (also called GBS). In some cases, your health care provider may decide to postpone influenza vaccination to a future visit. People with minor illnesses, such as a cold, may be vaccinated. People who are moderately or severely ill should usually wait until they recover before getting influenza vaccine. Your health care provider can give you more information. 4. Risks of a reaction     Soreness, redness, and swelling where shot is given, fever, muscle aches, and headache can happen after influenza vaccine.  There may be a very small increased risk of Guillain-Barré Syndrome (GBS) after inactivated influenza vaccine (the flu shot). [facility] children who get the flu shot along with pneumococcal vaccine (PCV13), and/or DTaP vaccine at the same time might be slightly more likely to have a seizure caused by fever. Tell your health care provider if a child who is getting flu vaccine has ever had a seizure. People sometimes faint after medical procedures, including vaccination. Tell your provider if you feel dizzy or have vision changes or ringing in the ears. As with any medicine, there is a very remote chance of a vaccine causing a severe allergic reaction, other serious injury, or death. 5. What if there is a serious problem? An allergic reaction could occur after the vaccinated person leaves the clinic.  If you see signs of a severe allergic reaction (hives, swelling of the face and throat, difficulty breathing, a fast heartbeat, dizziness, or weakness), call 9-1-1 and get the person to the nearest hospital.    For other signs that concern you, call your health care provider. Adverse reactions should be reported to the Vaccine Adverse Event Reporting System (VAERS). Your health care provider will usually file this report, or you can do it yourself. Visit the VAERS website at www.vaers. [facility].gov or call 7-588.987.4674. VAERS is only for reporting reactions, and VAERS staff do not give medical advice. 6. The National Vaccine Injury Compensation Program    The [facility] Vaccine Injury Compensation Program (VICP) is a federal program that was created to compensate people who may have been injured by certain vaccines. Visit the VICP website at www.[facility].gov/vaccinecompensation or call 4-907.954.9319 to learn about the program and about filing a claim. There is a time limit to file a claim for compensation. 7. How can I learn more?  Ask your health care provider.  Call your local or state health department.  Contact the Centers for Disease Control and Prevention (CDC):  - Call 5-600.772.6490 (1-800-CDC-INFO) or  - Visit CDCs influenza website at www.cdc.gov/flu    Vaccine Information Statement (Interim)  Inactivated Influenza Vaccine   8/15/2019  42 LASHAWN Lambert [id number]   Department of Health and Human Services  Centers for Disease Control and Prevention    Office Use Only

## 2019-11-21 ENCOUNTER — HOSPITAL ENCOUNTER (OUTPATIENT)
Dept: ULTRASOUND IMAGING | Age: 59
Discharge: HOME | End: 2019-11-21
Attending: NURSE PRACTITIONER
Payer: COMMERCIAL

## 2019-11-21 ENCOUNTER — TELEPHONE (OUTPATIENT)
Dept: FAMILY MEDICINE CLINIC | Age: 59
End: 2019-11-21

## 2019-11-21 DIAGNOSIS — Z86.718 HISTORY OF DVT (DEEP VEIN THROMBOSIS): ICD-10-CM

## 2019-11-21 DIAGNOSIS — M79.604 RIGHT LEG PAIN: ICD-10-CM

## 2019-11-21 DIAGNOSIS — I83.811 VARICOSE VEINS OF LEG WITH PAIN, RIGHT: ICD-10-CM

## 2019-11-21 PROCEDURE — 93971 EXTREMITY STUDY: CPT

## 2020-01-03 ENCOUNTER — OFFICE VISIT (OUTPATIENT)
Dept: INTERNAL MEDICINE CLINIC | Age: 60
End: 2020-01-03

## 2020-01-03 VITALS
SYSTOLIC BLOOD PRESSURE: 122 MMHG | RESPIRATION RATE: 14 BRPM | TEMPERATURE: 98.2 F | DIASTOLIC BLOOD PRESSURE: 79 MMHG | WEIGHT: 235 LBS | OXYGEN SATURATION: 96 % | HEART RATE: 68 BPM | HEIGHT: 70 IN | BODY MASS INDEX: 33.64 KG/M2

## 2020-01-03 DIAGNOSIS — I87.2 VENOUS INSUFFICIENCY: ICD-10-CM

## 2020-01-03 DIAGNOSIS — L03.115 CELLULITIS OF RIGHT LEG: Primary | ICD-10-CM

## 2020-01-03 DIAGNOSIS — S80.811A ABRASION OF RIGHT LOWER EXTREMITY, INITIAL ENCOUNTER: ICD-10-CM

## 2020-01-03 RX ORDER — SULFAMETHOXAZOLE AND TRIMETHOPRIM 800; 160 MG/1; MG/1
1 TABLET ORAL 2 TIMES DAILY
Qty: 20 TAB | Refills: 0 | Status: SHIPPED | OUTPATIENT
Start: 2020-01-03 | End: 2020-01-13

## 2020-01-03 NOTE — PROGRESS NOTES
HPI:  Ramón Baez is a 60y.o. year old male who returns to clinic today for an acute visit:   2 days ago Mercedes Ceasar on tree stump and landed on right lower leg. He had several layers including boots on at the time. He continued about his day and at the end of the day after removing boots noted tender area with scabbing right lower leg. Since then has become more tender and red around the area of scabbing. No drainage. Of note he is on chronic anticoagulation for history of right lower extremity DVT and also has right lower extremity venous insufficiency. Current Outpatient Medications   Medication Sig Dispense Refill    rivaroxaban (XARELTO) 20 mg tab tablet TAKE 1 TABLET BY MOUTH EVERY DAY 90 Tab 1    lovastatin (MEVACOR) 20 mg tablet TAKE ONE TABLET BY MOUTH NIGHTLY AT BEDTIME 90 Tab 3    Cetirizine (ZYRTEC) 10 mg Cap Take 1 capsule by mouth as needed. Allergies   Allergen Reactions    Codeine Rash and Itching     Social History     Tobacco Use    Smoking status: Never Smoker    Smokeless tobacco: Never Used   Substance Use Topics    Alcohol use: Yes     Alcohol/week: 0.0 standard drinks     Frequency: 2-4 times a month     Drinks per session: 1 or 2     Binge frequency: Never     Comment: 1 beer every 2 weeks         Review of Systems   Constitutional: Negative for chills and fever. Cardiovascular: Positive for leg swelling (Right leg). Neurological: Negative for sensory change and focal weakness. Physical Exam  Vitals signs and nursing note reviewed. Constitutional:       General: He is not in acute distress. Appearance: He is well-developed. HENT:      Head: Normocephalic and atraumatic. Cardiovascular:      Pulses: Normal pulses. Pulmonary:      Effort: Pulmonary effort is normal.   Musculoskeletal:      Right lower leg: Edema (Trace anterior pretibial edema) present. Left lower leg: No edema.        Right lower leg 2  raised areas of tenderness with surrounding erythema and superficial eschar, no fluctuance or drainage. Just superior to this is a partially healed ulceration from previous skin cancer removal        Assessment & Plan:    ICD-10-CM ICD-9-CM    1. Cellulitis of right leg L03.115 682.6    2. Abrasion of right lower extremity, initial encounter S80.811A 916.0    3. Venous insufficiency I87.2 459.81    plan:   Orders Placed This Encounter    trimethoprim-sulfamethoxazole (BACTRIM DS, SEPTRA DS) 160-800 mg per tablet   Counseled to keep leg elevated as able.,  Avoid standing for prolonged periods. Counseled regarding wound care and area is dressed prior to discharge. Reviewed red flags for urgent evaluation if worsening. I have discussed the diagnosis with the patient and the intended plan as seen in the above orders. The patient has received an after-visit summary and questions were answered concerning future plans. I have discussed medication side effects and warnings with the patient as well. He has expressed understanding of the diagnosis and plan    Follow-up and Dispositions    · Return if symptoms worsen or fail to improve. Aspects of this note may have been generated using voice recognition software.  Despite editing, there may be some syntax errors

## 2020-03-18 ENCOUNTER — OFFICE VISIT (OUTPATIENT)
Dept: INTERNAL MEDICINE CLINIC | Age: 60
End: 2020-03-18

## 2020-03-18 ENCOUNTER — TELEPHONE (OUTPATIENT)
Dept: INTERNAL MEDICINE CLINIC | Age: 60
End: 2020-03-18

## 2020-03-18 VITALS
WEIGHT: 232.4 LBS | HEIGHT: 70 IN | SYSTOLIC BLOOD PRESSURE: 110 MMHG | DIASTOLIC BLOOD PRESSURE: 80 MMHG | BODY MASS INDEX: 33.27 KG/M2 | TEMPERATURE: 97.8 F | HEART RATE: 84 BPM | RESPIRATION RATE: 16 BRPM | OXYGEN SATURATION: 97 %

## 2020-03-18 DIAGNOSIS — M54.6 ACUTE RIGHT-SIDED THORACIC BACK PAIN: Primary | ICD-10-CM

## 2020-03-18 RX ORDER — CYCLOBENZAPRINE HCL 10 MG
10 TABLET ORAL
Qty: 20 TAB | Refills: 0 | Status: SHIPPED | OUTPATIENT
Start: 2020-03-18 | End: 2020-06-24

## 2020-03-18 RX ORDER — METHYLPREDNISOLONE 4 MG/1
TABLET ORAL
Qty: 1 DOSE PACK | Refills: 0 | Status: SHIPPED | OUTPATIENT
Start: 2020-03-18 | End: 2020-06-24

## 2020-03-18 NOTE — PROGRESS NOTES
Chief Complaint   Patient presents with    Back Pain     x 1.5 wks     Reviewed record in preparation for visit and have obtained necessary documentation. Identified pt with two pt identifiers(name and ). Health Maintenance Due   Topic    Shingrix Vaccine Age 50> (1 of 2)    A1C test (Diabetic or Prediabetic)     Lipid Screen          Chief Complaint   Patient presents with    Back Pain     x 1.5 wks        Wt Readings from Last 3 Encounters:   20 232 lb 6.4 oz (105.4 kg)   20 235 lb (106.6 kg)   19 231 lb 9.6 oz (105.1 kg)     Temp Readings from Last 3 Encounters:   20 97.8 °F (36.6 °C) (Oral)   20 98.2 °F (36.8 °C) (Oral)   19 98.7 °F (37.1 °C) (Oral)     BP Readings from Last 3 Encounters:   20 110/80   20 122/79   19 110/70     Pulse Readings from Last 3 Encounters:   20 84   20 68   19 78           Learning Assessment:  :     Learning Assessment 2/15/2017 2016 2016 3/5/2014   PRIMARY LEARNER Patient Patient Patient Patient   HIGHEST LEVEL OF EDUCATION - PRIMARY LEARNER  - - - SOME COLLEGE   BARRIERS PRIMARY LEARNER NONE - NONE NONE     - - - NONE   CO-LEARNER CAREGIVER No - No No   PRIMARY LANGUAGE ENGLISH ENGLISH ENGLISH ENGLISH   LEARNER PREFERENCE PRIMARY DEMONSTRATION LISTENING DEMONSTRATION LISTENING   LEARNING SPECIAL TOPICS no no no -   ANSWERED BY patient patient patient Leydi Ruvalcaba   RELATIONSHIP SELF SELF SELF SELF   ASSESSMENT COMMENT none none none -       Depression Screening:  :     3 most recent PHQ Screens 2019   Little interest or pleasure in doing things Not at all   Feeling down, depressed, irritable, or hopeless Not at all   Total Score PHQ 2 0       Fall Risk Assessment:  :     Fall Risk Assessment, last 12 mths 2019   Able to walk? Yes   Fall in past 12 months?  No       Abuse Screening:  :     Abuse Screening Questionnaire 2019   Do you ever feel afraid of your partner? N N   Are you in a relationship with someone who physically or mentally threatens you? N N   Is it safe for you to go home? Y Y       Coordination of Care Questionnaire:  :     1) Have you been to an emergency room, urgent care clinic since your last visit? no   Hospitalized since your last visit? no             2) Have you seen or consulted any other health care providers outside of [address] [place] [address] since your last visit? no  (Include any pap smears or colon screenings in this section.)    3) Do you have an Advance Directive on file? no    4) Are you interested in receiving information on Advance Directives? NO      Patient is accompanied by self I have received verbal consent from Tom Jean-Baptiste to discuss any/all medical information while they are present in the room. Reviewed record  In preparation for visit and have obtained necessary documentation.

## 2020-03-18 NOTE — PROGRESS NOTES
HISTORY OF PRESENT ILLNESS  Radha Layton is a 60 y.o. male. Patient reports right-sided thoracic back pain x 1.5 weeks. He has taken tylenol and applied heat with no relief. Patient states it started after his nephew picked him up from behind and extended his back. He also played golf after this incident which did not help with the pain. It seemed a little worse after playing. Visit Vitals  /80 (BP 1 Location: Left arm, BP Patient Position: Sitting)   Pulse 84   Temp 97.8 °F (36.6 °C) (Oral)   Resp 16   Ht 5' 9.8\" (1.773 m)   Wt 232 lb 6.4 oz (105.4 kg)   SpO2 97%   BMI 33.54 kg/m²       HPI    Review of Systems   Musculoskeletal: Positive for back pain. Physical Exam  Constitutional:       Appearance: Normal appearance. Musculoskeletal:      Thoracic back: He exhibits tenderness (right paraspinal thoracic tenderness with palpation; worse with twisting left and right, not bending forward) and pain. He exhibits normal range of motion and no swelling. Skin:     General: Skin is warm and dry. Findings: No rash. Neurological:      General: No focal deficit present. Mental Status: He is alert and oriented to person, place, and time. Psychiatric:         Mood and Affect: Mood normal.         Behavior: Behavior normal.         ASSESSMENT and PLAN    ICD-10-CM ICD-9-CM    1.  Acute right-sided thoracic back pain M54.6 724.1      Orders Placed This Encounter    cyclobenzaprine (FLEXERIL) 10 mg tablet    methylPREDNISolone (MEDROL DOSEPACK) 4 mg tablet   ice x 20 minutes every 3 hours  Medrol and flexeril  Advised to call PCP if URI symptoms develop and consider stopping steroids if URI symptoms develop  Avoid heavy lifting until symptoms resolve

## 2020-05-16 DIAGNOSIS — E78.00 PURE HYPERCHOLESTEROLEMIA: ICD-10-CM

## 2020-05-17 DIAGNOSIS — I82.811 SUPERFICIAL THROMBOSIS OF RIGHT LOWER EXTREMITY: ICD-10-CM

## 2020-05-17 DIAGNOSIS — Z86.718 HISTORY OF DVT (DEEP VEIN THROMBOSIS): ICD-10-CM

## 2020-05-17 RX ORDER — LOVASTATIN 20 MG/1
TABLET ORAL
Qty: 90 TAB | Refills: 0 | Status: SHIPPED | OUTPATIENT
Start: 2020-05-17 | End: 2020-08-13

## 2020-05-17 NOTE — TELEPHONE ENCOUNTER
Last seen March '19 for regular f/u, only acute since then. Due for f/u and labs.   Needs visit in next 1-2 months

## 2020-06-23 ENCOUNTER — TELEPHONE (OUTPATIENT)
Dept: INTERNAL MEDICINE CLINIC | Age: 60
End: 2020-06-23

## 2020-06-24 ENCOUNTER — OFFICE VISIT (OUTPATIENT)
Dept: INTERNAL MEDICINE CLINIC | Age: 60
End: 2020-06-24

## 2020-06-24 VITALS
BODY MASS INDEX: 32.78 KG/M2 | RESPIRATION RATE: 16 BRPM | TEMPERATURE: 97.6 F | HEIGHT: 70 IN | HEART RATE: 72 BPM | OXYGEN SATURATION: 97 % | SYSTOLIC BLOOD PRESSURE: 136 MMHG | DIASTOLIC BLOOD PRESSURE: 84 MMHG | WEIGHT: 229 LBS

## 2020-06-24 DIAGNOSIS — Z00.00 ROUTINE PHYSICAL EXAMINATION: Primary | ICD-10-CM

## 2020-06-24 DIAGNOSIS — R73.03 PREDIABETES: ICD-10-CM

## 2020-06-24 DIAGNOSIS — E78.00 PURE HYPERCHOLESTEROLEMIA: ICD-10-CM

## 2020-06-24 DIAGNOSIS — Z86.718 HISTORY OF DVT (DEEP VEIN THROMBOSIS): ICD-10-CM

## 2020-06-24 DIAGNOSIS — E66.9 OBESITY (BMI 30.0-34.9): ICD-10-CM

## 2020-06-24 DIAGNOSIS — Z81.8 FAMILY HISTORY OF DEMENTIA: ICD-10-CM

## 2020-06-24 RX ORDER — ZOSTER VACCINE RECOMBINANT, ADJUVANTED 50 MCG/0.5
KIT INTRAMUSCULAR
Qty: 0.5 ML | Refills: 1 | Status: CANCELLED | OUTPATIENT
Start: 2020-06-24

## 2020-06-24 NOTE — PATIENT INSTRUCTIONS
Well Visit, Men 48 to 72: Care Instructions Your Care Instructions Physical exams can help you stay healthy. Your doctor has checked your overall health and may have suggested ways to take good care of yourself. He or she also may have recommended tests. At home, you can help prevent illness with healthy eating, regular exercise, and other steps. Follow-up care is a key part of your treatment and safety. Be sure to make and go to all appointments, and call your doctor if you are having problems. It's also a good idea to know your test results and keep a list of the medicines you take. How can you care for yourself at home? · Reach and stay at a healthy weight. This will lower your risk for many problems, such as obesity, diabetes, heart disease, and high blood pressure. · Get at least 30 minutes of exercise on most days of the week. Walking is a good choice. You also may want to do other activities, such as running, swimming, cycling, or playing tennis or team sports. · Do not smoke. Smoking can make health problems worse. If you need help quitting, talk to your doctor about stop-smoking programs and medicines. These can increase your chances of quitting for good. · Protect your skin from too much sun. When you're outdoors from 10 a.m. to 4 p.m., stay in the shade or cover up with clothing and a hat with a wide brim. Wear sunglasses that block UV rays. Even when it's cloudy, put broad-spectrum sunscreen (SPF 30 or higher) on any exposed skin. · See a dentist one or two times a year for checkups and to have your teeth cleaned. · Wear a seat belt in the car. Follow your doctor's advice about when to have certain tests. These tests can spot problems early. · Cholesterol. Your doctor will tell you how often to have this done based on your overall health and other things that can increase your risk for heart attack and stroke. · Blood pressure.  Have your blood pressure checked during a routine doctor visit. Your doctor will tell you how often to check your blood pressure based on your age, your blood pressure results, and other factors. · Prostate exam. Talk to your doctor about whether you should have a blood test (called a PSA test) for prostate cancer. Experts recommend that you discuss the benefits and risks of the test with your doctor before you decide whether to have this test. 
· Diabetes. Ask your doctor whether you should have tests for diabetes. · Vision. Some experts recommend that you have yearly exams for glaucoma and other age-related eye problems starting at age 48. · Hearing. Tell your doctor if you notice any change in your hearing. You can have tests to find out how well you hear. · Colorectal cancer. Your risk for colorectal cancer gets higher as you get older. Some experts say that adults should start regular screening at age 48 and stop at age 76. Others say to start before age 48 or continue after age 76. Talk with your doctor about your risk and when to start and stop screening. · Heart attack and stroke risk. At least every 4 to 6 years, you should have your risk for heart attack and stroke assessed. Your doctor uses factors such as your age, blood pressure, cholesterol, and whether you smoke or have diabetes to show what your risk for a heart attack or stroke is over the next 10 years. · Abdominal aortic aneurysm. Ask your doctor whether you should have a test to check for an aneurysm. You may need a test if you ever smoked or if your parent, brother, sister, or child has had an aneurysm. When should you call for help? Watch closely for changes in your health, and be sure to contact your doctor if you have any problems or symptoms that concern you. Where can you learn more? Go to http://dani-adelita.info/ Enter F109 in the search box to learn more about \"Well Visit, Men 48 to 72: Care Instructions. \" 
 Current as of: August 22, 2019               Content Version: 12.5 © 8025-4671 [employer]. Care instructions adapted under license by [employer] (which disclaims liability or warranty for this information). If you have questions about a medical condition or this instruction, always ask your healthcare professional. [address] any warranty or liability for your use of this information.

## 2020-06-24 NOTE — PROGRESS NOTES
Verona Cabot is a 60 y.o. male who was seen in clinic today (6/24/2020) for a full physical.          Assessment & Plan:   Diagnoses and all orders for this visit:    1. Routine physical examination  -     METABOLIC PANEL, COMPREHENSIVE  -     CBC W/O DIFF  -     LIPID PANEL  -     HEMOGLOBIN A1C WITH EAG  -     PSA, DIAGNOSTIC (PROSTATE SPECIFIC AG)    2. History of DVT (deep vein thrombosis)- well controlled, continue current treatment. Varicose veins look stable  -     CBC W/O DIFF    3. Pure hypercholesterolemia- previously at goal, continue current treatment pending review of labs   -     METABOLIC PANEL, COMPREHENSIVE  -     LIPID PANEL    4. Prediabetes- due for labs, work on diet & weight  -     METABOLIC PANEL, COMPREHENSIVE  -     HEMOGLOBIN A1C WITH EAG    5. Obesity (BMI 30.0-34.9)- stable, needs improvement, I have recommended the following interventions: encourage exercise and lifestyle education regarding diet. 6. Family history of dementia- will get baseline  -     REFERRAL TO PSYCHOLOGY      Follow-up and Dispositions    · Return in about 1 year (around 6/24/2021) for FULL PHYSICAL - 30 minutes.            ------------------------------------------------------------------------------------------    Subjective:   Tisha Higginbotham is here today for a full physical.        Health Maintenance  Immunizations:    Influenza: up to date   Tetanus: up to date   Shingles: not up to date - will defer due to COVID restrictions   Pneumonia: n/a   Cancer screening:     Prostate: guidelines reviewed, will do today   Colon: guidelines reviewed, up to date        Patient Care Team:  Salome Schreiber MD as PCP - General (Internal Medicine)  Salome Schreiber MD as PCP - [facility] Provider  Love Castro MD (Dermatology)  Leesa Salazar MD (Dermatology)       In addition to the above issues we also reviewed the following acute and/or chronic problems:    Cardiovascular Review  The patient has hyperlipidemia. Since last visit: no changes. He reports taking medications as instructed, no medication side effects noted. Diet and Lifestyle: generally follows a low fat low cholesterol diet. Labs: reviewed and discussed with patient. Anticoagulation  Patient here for followup of chronic anticoagulation due to unprovoked DVT. Duration: life long. Bleeding Signs/Symptoms: None. Thromboembolic Signs/Symptoms: None. Recent falls:  None. Endocrine Review  He is seen for pre-diabetes. Since last visit: no significant changes. Home glucose monitoring: is not performed. Diabetic diet compliance: compliant most of the time. Lab review: labs reviewed and discussed with patient. The following sections were reviewed & updated as appropriate: PMH, PSH, FH, and SH. Patient Active Problem List   Diagnosis Code    Hyperlipidemia E78.5    Squamous cell carcinoma in situ of skin G98.6    Umbilical hernia M16.3    Ventral hernia K43.9    Superficial thrombosis of leg I82.819    Obesity (BMI 30.0-34. 9) E66.9    Prediabetes R73.03    History of DVT (deep vein thrombosis) Z86.718          Prior to Admission medications    Medication Sig Start Date End Date Taking? Authorizing Provider   lovastatin (MEVACOR) 20 mg tablet TAKE 1 TABLET BY MOUTH EVERYDAY AT BEDTIME 5/17/20  Yes Marla Allen MD   rivaroxaban (Xarelto) 20 mg tab tablet TAKE 1 TABLET BY MOUTH EVERY DAY 5/17/20  Yes Marla Allen MD   Cetirizine (ZYRTEC) 10 mg Cap Take 1 capsule by mouth as needed. Yes Provider, Historical   cyclobenzaprine (FLEXERIL) 10 mg tablet Take 1 Tab by mouth three (3) times daily as needed for Muscle Spasm(s).  3/18/20 6/24/20  Shelby Hatfield NP   methylPREDNISolone (MEDROL DOSEPACK) 4 mg tablet Follow dose pack instructions 3/18/20 6/24/20  Shelby Hatfield NP          Allergies   Allergen Reactions    Codeine Rash and Itching             Review of Systems   Constitutional: Negative for chills and fever. Respiratory: Negative for cough and shortness of breath. Cardiovascular: Negative for chest pain and palpitations. Gastrointestinal: Negative for abdominal pain, blood in stool, constipation, diarrhea, heartburn, nausea and vomiting. Genitourinary:        He denies: nocturia, urinary hesitancy, urinary frequency, weak stream.       Reports trouble getting an erection or maintaining an erection. Reports trouble with AM erections. Musculoskeletal: Negative for joint pain and myalgias. Neurological: Negative for tingling, focal weakness and headaches. Endo/Heme/Allergies: Does not bruise/bleed easily. Psychiatric/Behavioral: Negative for depression. The patient is not nervous/anxious and does not have insomnia. Objective:   Physical Exam  Constitutional:       General: He is not in acute distress. Appearance: He is well-developed. He is obese. HENT:      Right Ear: Tympanic membrane and ear canal normal.      Left Ear: Tympanic membrane and ear canal normal.      Mouth/Throat:      Mouth: Mucous membranes are moist.      Pharynx: No posterior oropharyngeal erythema. Eyes:      Conjunctiva/sclera: Conjunctivae normal.   Cardiovascular:      Rate and Rhythm: Regular rhythm. Heart sounds: Normal heart sounds. No murmur. Pulmonary:      Effort: Pulmonary effort is normal.      Breath sounds: Normal breath sounds. Abdominal:      General: Bowel sounds are normal.      Palpations: Abdomen is soft. Tenderness: There is no abdominal tenderness. Musculoskeletal:      Right lower leg: No edema. Left lower leg: No edema. Lymphadenopathy:      Cervical: No cervical adenopathy.    Skin:     Comments: Varicose veins in L leg   Psychiatric:         Mood and Affect: Mood and affect normal.            Visit Vitals  /84   Pulse 72   Temp 97.6 °F (36.4 °C) (Temporal)   Resp 16   Ht 5' 9.8\" (1.773 m)   Wt 229 lb (103.9 kg)   SpO2 97%   BMI 33.05 kg/m²          Advised him to call back or return to office if symptoms worsen/change/persist.  Discussed expected course/resolution/complications of diagnosis in detail with patient. Medication risks/benefits/costs/interactions/alternatives discussed with patient. He was given an after visit summary which includes diagnoses, current medications, & vitals. He expressed understanding with the diagnosis and plan. Aspects of this note may have been generated using voice recognition software. Despite editing, there may be some syntax errors.        Rik Knight MD

## 2020-06-25 LAB
ALBUMIN SERPL-MCNC: 4.5 G/DL (ref 3.8–4.9)
ALBUMIN/GLOB SERPL: 2.3 {RATIO} (ref 1.2–2.2)
ALP SERPL-CCNC: 63 IU/L (ref 39–117)
ALT SERPL-CCNC: 28 IU/L (ref 0–44)
AST SERPL-CCNC: 23 IU/L (ref 0–40)
BILIRUB SERPL-MCNC: 0.4 MG/DL (ref 0–1.2)
BUN SERPL-MCNC: 16 MG/DL (ref 8–27)
BUN/CREAT SERPL: 16 (ref 10–24)
CALCIUM SERPL-MCNC: 9.2 MG/DL (ref 8.6–10.2)
CHLORIDE SERPL-SCNC: 107 MMOL/L (ref 96–106)
CHOLEST SERPL-MCNC: 168 MG/DL (ref 100–199)
CO2 SERPL-SCNC: 21 MMOL/L (ref 20–29)
CREAT SERPL-MCNC: 1.03 MG/DL (ref 0.76–1.27)
ERYTHROCYTE [DISTWIDTH] IN BLOOD BY AUTOMATED COUNT: 13.9 % (ref 11.6–15.4)
EST. AVERAGE GLUCOSE BLD GHB EST-MCNC: 126 MG/DL
GLOBULIN SER CALC-MCNC: 2 G/DL (ref 1.5–4.5)
GLUCOSE SERPL-MCNC: 99 MG/DL (ref 65–99)
HBA1C MFR BLD: 6 % (ref 4.8–5.6)
HCT VFR BLD AUTO: 46.9 % (ref 37.5–51)
HDLC SERPL-MCNC: 67 MG/DL
HGB BLD-MCNC: 15.4 G/DL (ref 13–17.7)
LDLC SERPL CALC-MCNC: 88 MG/DL (ref 0–99)
MCH RBC QN AUTO: 29.2 PG (ref 26.6–33)
MCHC RBC AUTO-ENTMCNC: 32.8 G/DL (ref 31.5–35.7)
MCV RBC AUTO: 89 FL (ref 79–97)
PLATELET # BLD AUTO: 197 X10E3/UL (ref 150–450)
POTASSIUM SERPL-SCNC: 4.5 MMOL/L (ref 3.5–5.2)
PROT SERPL-MCNC: 6.5 G/DL (ref 6–8.5)
PSA SERPL-MCNC: 1.1 NG/ML (ref 0–4)
RBC # BLD AUTO: 5.28 X10E6/UL (ref 4.14–5.8)
SODIUM SERPL-SCNC: 141 MMOL/L (ref 134–144)
TRIGL SERPL-MCNC: 67 MG/DL (ref 0–149)
VLDLC SERPL CALC-MCNC: 13 MG/DL (ref 5–40)
WBC # BLD AUTO: 5.1 X10E3/UL (ref 3.4–10.8)

## 2020-06-26 NOTE — PROGRESS NOTES
Results released to patient via [employer]. All labs are stable or at goal for him.   A1c stable, FBS normal.

## 2020-08-12 DIAGNOSIS — Z86.718 HISTORY OF DVT (DEEP VEIN THROMBOSIS): ICD-10-CM

## 2020-08-12 DIAGNOSIS — I82.811 SUPERFICIAL THROMBOSIS OF RIGHT LOWER EXTREMITY: ICD-10-CM

## 2020-08-12 DIAGNOSIS — E78.00 PURE HYPERCHOLESTEROLEMIA: ICD-10-CM

## 2020-08-13 RX ORDER — LOVASTATIN 20 MG/1
TABLET ORAL
Qty: 90 TAB | Refills: 1 | Status: SHIPPED | OUTPATIENT
Start: 2020-08-13 | End: 2021-02-18

## 2021-02-17 DIAGNOSIS — I82.811 SUPERFICIAL THROMBOSIS OF RIGHT LOWER EXTREMITY: ICD-10-CM

## 2021-02-17 DIAGNOSIS — E78.00 PURE HYPERCHOLESTEROLEMIA: ICD-10-CM

## 2021-02-17 DIAGNOSIS — Z86.718 HISTORY OF DVT (DEEP VEIN THROMBOSIS): ICD-10-CM

## 2021-02-18 RX ORDER — LOVASTATIN 20 MG/1
TABLET ORAL
Qty: 90 TAB | Refills: 1 | Status: SHIPPED | OUTPATIENT
Start: 2021-02-18 | End: 2021-08-18

## 2021-03-02 ENCOUNTER — OFFICE VISIT (OUTPATIENT)
Dept: INTERNAL MEDICINE CLINIC | Age: 61
End: 2021-03-02
Payer: COMMERCIAL

## 2021-03-02 VITALS
HEIGHT: 70 IN | SYSTOLIC BLOOD PRESSURE: 120 MMHG | DIASTOLIC BLOOD PRESSURE: 80 MMHG | BODY MASS INDEX: 33.79 KG/M2 | OXYGEN SATURATION: 96 % | WEIGHT: 236 LBS | TEMPERATURE: 97.3 F | RESPIRATION RATE: 12 BRPM | HEART RATE: 64 BPM

## 2021-03-02 DIAGNOSIS — G89.29 CHRONIC RIGHT SHOULDER PAIN: Primary | ICD-10-CM

## 2021-03-02 DIAGNOSIS — N52.9 ERECTILE DYSFUNCTION, UNSPECIFIED ERECTILE DYSFUNCTION TYPE: ICD-10-CM

## 2021-03-02 DIAGNOSIS — M25.511 CHRONIC RIGHT SHOULDER PAIN: Primary | ICD-10-CM

## 2021-03-02 DIAGNOSIS — Z23 ENCOUNTER FOR IMMUNIZATION: ICD-10-CM

## 2021-03-02 PROCEDURE — 99213 OFFICE O/P EST LOW 20 MIN: CPT | Performed by: INTERNAL MEDICINE

## 2021-03-02 RX ORDER — SILDENAFIL CITRATE 20 MG/1
TABLET ORAL
Qty: 30 TAB | Refills: 0 | Status: SHIPPED | OUTPATIENT
Start: 2021-03-02 | End: 2021-03-29

## 2021-03-02 RX ORDER — ZOSTER VACCINE RECOMBINANT, ADJUVANTED 50 MCG/0.5
KIT INTRAMUSCULAR
Qty: 0.5 ML | Refills: 1 | Status: SHIPPED | OUTPATIENT
Start: 2021-03-02 | End: 2021-11-19 | Stop reason: SDUPTHER

## 2021-03-02 NOTE — PATIENT INSTRUCTIONS
PHYSICAL THERAPY [address] [facility] [address] [facility], [address] 
 [phone] [facility] Physical Therapy & Sports Performance [address] [facility] [address] [place], [address] [facility] 
 [phone] Physical Therapy & Sports Performance at [facility] [place] [address] [facility], [address] 
 [phone] [facility] (previously [facility]). Multiple locations [facility] [address] [facility], [address] 
 [phone] [place] [facility] [address] [facility], [facility] [address] 
 [phone] [address] [facility] 
 [phone] [facility] [address] [facility] 
 [phone] [place] [address] [facility] 
 [phone] [facility] Multiple Locations  
 [phone] Rotator Cuff: Exercises Introduction Here are some examples of exercises for you to try. The exercises may be suggested for a condition or for rehabilitation. Start each exercise slowly. Ease off the exercises if you start to have pain. You will be told when to start these exercises and which ones will work best for you. How to do the exercises Pendulum swing If you have pain in your back, do not do this exercise. 1. Hold on to a table or the back of a chair with your good arm. Then bend forward a little and let your sore arm hang straight down. This exercise does not use the arm muscles. Rather, use your legs and your hips to create movement that makes your arm swing freely. 2. Use the movement from your hips and legs to guide the slightly swinging arm back and forth like a pendulum (or elephant trunk). Then guide it in circles that start small (about the size of a dinner plate). Make the circles a bit larger each day, as your pain allows. 3. Do this exercise for 5 minutes, 5 to 7 times each day. 4. As you have less pain, try bending over a little farther to do this exercise. This will increase the amount of movement at your shoulder. Posterior stretching exercise 1. Hold the elbow of your injured arm with your other hand. 2. Use your hand to pull your injured arm gently up and across your body. You will feel a gentle stretch across the back of your injured shoulder. 3. Hold for at least 15 to 30 seconds. Then slowly lower your arm. 4. Repeat 2 to 4 times. Up-the-back stretch Your doctor or physical therapist may want you to wait to do this stretch until you have regained most of your range of motion and strength. You can do this stretch in different ways. Hold any of these stretches for at least 15 to 30 seconds. Repeat them 2 to 4 times. 1. Light stretch: Put your hand in your back pocket. Let it rest there to stretch your shoulder. 2. Moderate stretch: With your other hand, hold your injured arm (palm outward) behind your back by the wrist. Pull your arm up gently to stretch your shoulder. 3. Advanced stretch: Put a towel over your other shoulder. Put the hand of your injured arm behind your back. Now hold the back end of the towel. With the other hand, hold the front end of the towel in front of your body. Pull gently on the front end of the towel. This will bring your hand farther up your back to stretch your shoulder. Overhead stretch 1. Standing about an arm's length away, grasp onto a solid surface. You could use a countertop, a doorknob, or the back of a sturdy chair. 2. With your knees slightly bent, bend forward with your arms straight. Lower your upper body, and let your shoulders stretch. 3. As your shoulders are able to stretch farther, you may need to take a step or two backward. 4. Hold for at least 15 to 30 seconds. Then stand up and relax. If you had stepped back during your stretch, step forward so you can keep your hands on the solid surface. 5. Repeat 2 to 4 times. Shoulder flexion (lying down) To make a wand for this exercise, use a piece of PVC pipe or a broom handle with the broom removed. Make the wand about a foot wider than your shoulders. 1. Lie on your back, holding a wand with both hands. Your palms should face down as you hold the wand. 2. Keeping your elbows straight, slowly raise your arms over your head. Raise them until you feel a stretch in your shoulders, upper back, and chest. 
3. Hold for 15 to 30 seconds. 4. Repeat 2 to 4 times. Shoulder rotation (lying down) To make a wand for this exercise, use a piece of PVC pipe or a broom handle with the broom removed. Make the wand about a foot wider than your shoulders. 1. Lie on your back. Hold a wand with both hands with your elbows bent and palms up. 2. Keep your elbows close to your body, and move the wand across your body toward the sore arm. 3. Hold for 8 to 12 seconds. 4. Repeat 2 to 4 times. Wall climbing (to the side) Avoid any movement that is straight to your side, and be careful not to arch your back. Your arm should stay about 30 degrees to the front of your side. 1. Stand with your side to a wall so that your fingers can just touch it at an angle about 30 degrees toward the front of your body. 2. Walk the fingers of your injured arm up the wall as high as pain permits. Try not to shrug your shoulder up toward your ear as you move your arm up. 3. Hold that position for a count of at least 15 to 20. 
4. Walk your fingers back down to the starting position. 5. Repeat at least 2 to 4 times. Try to reach higher each time. Wall climbing (to the front) During this stretching exercise, be careful not to arch your back. 1. Face a wall, and stand so your fingers can just touch it. 2. Keeping your shoulder down, walk the fingers of your injured arm up the wall as high as pain permits. (Don't shrug your shoulder up toward your ear.) 3. Hold your arm in that position for at least 15 to 30 seconds. 4. Slowly walk your fingers back down to where you started. 5. Repeat at least 2 to 4 times. Try to reach higher each time. Shoulder blade squeeze 1. Stand with your arms at your sides, and squeeze your shoulder blades together. Do not raise your shoulders up as you squeeze. 2. Hold 6 seconds. 3. Repeat 8 to 12 times. Scapular exercise: Arm reach 1. Lie flat on your back. This exercise is a very slight motion that starts with your arms raised (elbows straight, arms straight). 2. From this position, reach higher toward the angelica or ceiling. Keep your elbows straight. All motion should be from your shoulder blade only. 3. Relax your arms back to where you started. 4. Repeat 8 to 12 times. Arm raise to the side During this strengthening exercise, your arm should stay about 30 degrees to the front of your side. 1. Slowly raise your injured arm to the side, with your thumb facing up. Raise your arm 60 degrees at the most (shoulder level is 90 degrees). 2. Hold the position for 3 to 5 seconds. Then lower your arm back to your side. If you need to, bring your \"good\" arm across your body and place it under the elbow as you lower your injured arm. Use your good arm to keep your injured arm from dropping down too fast. 
3. Repeat 8 to 12 times. 4. When you first start out, don't hold any extra weight in your hand. As you get stronger, you may use a 1-pound to 2-pound dumbbell or a small can of food. Shoulder flexor and extensor exercise These are isometric exercises. That means you contract your muscles without actually moving. 1. Push forward (flex): Stand facing a wall or doorjamb, about 6 inches or less back. Hold your injured arm against your body. Make a closed fist with your thumb on top. Then gently push your hand forward into the wall with about 25% to 50% of your strength. Don't let your body move backward as you push. Hold for about 6 seconds. Relax for a few seconds. Repeat 8 to 12 times. 2. Push backward (extend): Stand with your back flat against a wall. Your upper arm should be against the wall, with your elbow bent 90 degrees (your hand straight ahead). Push your elbow gently back against the wall with about 25% to 50% of your strength. Don't let your body move forward as you push. Hold for about 6 seconds. Relax for a few seconds. Repeat 8 to 12 times. Scapular exercise: Wall push-ups This exercise is best done with your fingers somewhat turned out, rather than straight up and down. 1. Stand facing a wall, about 12 inches to 18 inches away. 2. Place your hands on the wall at shoulder height. 3. Slowly bend your elbows and bring your face to the wall. Keep your back and hips straight. 4. Push back to where you started. 5. Repeat 8 to 12 times. 6. When you can do this exercise against a wall comfortably, you can try it against a counter. You can then slowly progress to the end of a couch, then to a sturdy chair, and finally to the floor. Scapular exercise: Retraction For this exercise, you will need elastic exercise material, such as surgical tubing or Thera-Band. 1. Put the band around a solid object at about waist level. (A bedpost will work well.) Each hand should hold an end of the band. 2. With your elbows at your sides and bent to 90 degrees, pull the band back. Your shoulder blades should move toward each other. Then move your arms back where you started. 3. Repeat 8 to 12 times. 4. If you have good range of motion in your shoulders, try this exercise with your arms lifted out to the sides. Keep your elbows at a 90-degree angle. Raise the elastic band up to about shoulder level. Pull the band back to move your shoulder blades toward each other. Then move your arms back where you started. Internal rotator strengthening exercise 1. Start by tying a piece of elastic exercise material to a doorknob. You can use surgical tubing or Thera-Band. 2. Stand or sit with your shoulder relaxed and your elbow bent 90 degrees. Your upper arm should rest comfortably against your side. Squeeze a rolled towel between your elbow and your body for comfort. This will help keep your arm at your side. 3. Hold one end of the elastic band in the hand of the painful arm. 4. Slowly rotate your forearm toward your body until it touches your belly. Slowly move it back to where you started. 5. Keep your elbow and upper arm firmly tucked against the towel roll or at your side. 6. Repeat 8 to 12 times. External rotator strengthening exercise 1. Start by tying a piece of elastic exercise material to a doorknob. You can use surgical tubing or Thera-Band. (You may also hold one end of the band in each hand.) 2. Stand or sit with your shoulder relaxed and your elbow bent 90 degrees. Your upper arm should rest comfortably against your side. Squeeze a rolled towel between your elbow and your body for comfort. This will help keep your arm at your side. 3. Hold one end of the elastic band with the hand of the painful arm. 4. Start with your forearm across your belly. Slowly rotate the forearm out away from your body. Keep your elbow and upper arm tucked against the towel roll or the side of your body until you begin to feel tightness in your shoulder. Slowly move your arm back to where you started. 5. Repeat 8 to 12 times. Follow-up care is a key part of your treatment and safety. Be sure to make and go to all appointments, and call your doctor if you are having problems. It's also a good idea to know your test results and keep a list of the medicines you take. Where can you learn more? Go to http://www.[employer]/ Enter Harris Nazario in the search box to learn more about \"Rotator Cuff: Exercises. \" Current as of: March 2, 2020               Content Version: 12.6 © 2006-2020 [employer]. Care instructions adapted under license by [employer] (which disclaims liability or warranty for this information). If you have questions about a medical condition or this instruction, always ask your healthcare professional. [address] any warranty or liability for your use of this information.

## 2021-03-02 NOTE — PROGRESS NOTES
Parker Davis is a 63 y.o. male who was seen in clinic today (3/2/2021) for an acute visit. He is planning on retiring end of this year. Assessment & Plan:   Diagnoses and all orders for this visit:    1. Chronic right shoulder pain- this is a chronic problem but new to me, symptoms are: worsened, differential dx reviewed with the patient, favor rotator cuff tendonitis more then tear. Will treat with: Tylenol, NSAIDs, rest, stretching. Red flags were reviewed with the patient to RTC or notify me, expected time course for resolution reviewed. See AVS.  If no changes he will notify me and will get MRI.   -     REFERRAL TO PHYSICAL THERAPY    2. Erectile dysfunction, unspecified erectile dysfunction type- this is a new problem, it is effecting his quality of life, no red flags. Reviewed med options and will start on meds below. Expectations reviewed. Will re-address at f/u.   -     sildenafiL, pulmonary hypertension, (REVATIO) 20 mg tablet; 1-5 tabs taken daily as needed 30 minutes prior to intercourse    3. Encounter for immunization  -     varicella-zoster recombinant, PF, (Shingrix, PF,) 50 mcg/0.5 mL susr injection; 0.5 ml IM once and then repeat in 2-6 months. Subjective/Objective:   Colette Antonio was seen today for Shoulder Pain    Pain Review:  He presents do to pain of his right shoulder that is secondary to no known injury and started 6 months ago. Since it started his pain has worsened. He describes the pain as sharp. It is intermittent. The pain radiates: no where. He denies weakness, denies numbness, denies paresthesias. Exacerbating factors identifiable by patient are certain movement and sleeping on it. He has tried the following: tylenol. These have been: somewhat effective. Previous workup: none. He reports injuring it years ago but has had no issues until now. Urology Review  He is here today because of ED. He reports his symptoms are chronic but getting worse.   Now it is getting to the point that it is effecting his ability to have intercourse. He reports trouble getting or maintaining an erection. He is on the following medications: nothing. Prior to Admission medications    Medication Sig Start Date End Date Taking? Authorizing Provider   lovastatin (MEVACOR) 20 mg tablet TAKE 1 TABLET BY MOUTH EVERYDAY AT BEDTIME 2/18/21  Yes Dani Cronin MD   rivaroxaban (Xarelto) 20 mg tab tablet TAKE 1 TABLET BY MOUTH EVERY DAY 2/18/21  Yes Dani Cronin MD   Cetirizine (ZYRTEC) 10 mg Cap Take 1 capsule by mouth as needed. Yes Provider, Historical           Physical Exam  Musculoskeletal:      Right shoulder: He exhibits normal range of motion, no tenderness and normal strength. Comments: Right Shoulder- Empty Can Test: Negative but there was some pain. Cross-Chest Test: Negative. Pain present when reaching behind his back         Visit Vitals  /80   Pulse 64   Temp 97.3 °F (36.3 °C) (Temporal)   Resp 12   Ht 5' 9.8\" (1.773 m)   Wt 236 lb (107 kg)   SpO2 96%   BMI 34.06 kg/m²         Disclaimer:  Advised him to call back or return to office if symptoms worsen/change/persist.  Discussed expected course/resolution/complications of diagnosis in detail with patient. Medication risks/benefits/costs/interactions/alternatives discussed with patient. He was given an after visit summary which includes diagnoses, current medications, & vitals. He expressed understanding with the diagnosis and plan. Aspects of this note may have been generated using voice recognition software. Despite editing, there may be some syntax errors.        Valerie Jha MD

## 2021-03-29 ENCOUNTER — IMMUNIZATION (OUTPATIENT)
Dept: INTERNAL MEDICINE CLINIC | Age: 61
End: 2021-03-29
Payer: COMMERCIAL

## 2021-03-29 DIAGNOSIS — N52.9 ERECTILE DYSFUNCTION, UNSPECIFIED ERECTILE DYSFUNCTION TYPE: ICD-10-CM

## 2021-03-29 DIAGNOSIS — Z23 ENCOUNTER FOR IMMUNIZATION: Primary | ICD-10-CM

## 2021-03-29 PROCEDURE — 0001A COVID-19, MRNA, LNP-S, PF, 30MCG/0.3ML DOSE(PFIZER): CPT | Performed by: FAMILY MEDICINE

## 2021-03-29 PROCEDURE — 91300 COVID-19, MRNA, LNP-S, PF, 30MCG/0.3ML DOSE(PFIZER): CPT | Performed by: FAMILY MEDICINE

## 2021-03-29 RX ORDER — SILDENAFIL CITRATE 20 MG/1
TABLET ORAL
Qty: 30 TAB | Refills: 0 | Status: SHIPPED | OUTPATIENT
Start: 2021-03-29

## 2021-04-19 ENCOUNTER — IMMUNIZATION (OUTPATIENT)
Dept: INTERNAL MEDICINE CLINIC | Age: 61
End: 2021-04-19
Payer: COMMERCIAL

## 2021-04-19 DIAGNOSIS — Z23 ENCOUNTER FOR IMMUNIZATION: Primary | ICD-10-CM

## 2021-04-19 PROCEDURE — 91300 COVID-19, MRNA, LNP-S, PF, 30MCG/0.3ML DOSE(PFIZER): CPT | Performed by: FAMILY MEDICINE

## 2021-04-19 PROCEDURE — 0002A COVID-19, MRNA, LNP-S, PF, 30MCG/0.3ML DOSE(PFIZER): CPT | Performed by: FAMILY MEDICINE

## 2021-08-18 DIAGNOSIS — I82.811 SUPERFICIAL THROMBOSIS OF RIGHT LOWER EXTREMITY: ICD-10-CM

## 2021-08-18 DIAGNOSIS — E78.00 PURE HYPERCHOLESTEROLEMIA: ICD-10-CM

## 2021-08-18 DIAGNOSIS — Z86.718 HISTORY OF DVT (DEEP VEIN THROMBOSIS): ICD-10-CM

## 2021-08-18 RX ORDER — LOVASTATIN 20 MG/1
TABLET ORAL
Qty: 90 TABLET | Refills: 1 | Status: SHIPPED | OUTPATIENT
Start: 2021-08-18 | End: 2022-02-20

## 2021-11-19 ENCOUNTER — OFFICE VISIT (OUTPATIENT)
Dept: INTERNAL MEDICINE CLINIC | Age: 61
End: 2021-11-19
Payer: COMMERCIAL

## 2021-11-19 VITALS
HEART RATE: 66 BPM | OXYGEN SATURATION: 98 % | DIASTOLIC BLOOD PRESSURE: 78 MMHG | HEIGHT: 70 IN | BODY MASS INDEX: 33.64 KG/M2 | WEIGHT: 235 LBS | RESPIRATION RATE: 14 BRPM | TEMPERATURE: 97.5 F | SYSTOLIC BLOOD PRESSURE: 124 MMHG

## 2021-11-19 DIAGNOSIS — Z23 ENCOUNTER FOR IMMUNIZATION: ICD-10-CM

## 2021-11-19 DIAGNOSIS — N52.9 ERECTILE DYSFUNCTION, UNSPECIFIED ERECTILE DYSFUNCTION TYPE: ICD-10-CM

## 2021-11-19 DIAGNOSIS — E66.9 OBESITY (BMI 30.0-34.9): ICD-10-CM

## 2021-11-19 DIAGNOSIS — Z00.00 ROUTINE PHYSICAL EXAMINATION: Primary | ICD-10-CM

## 2021-11-19 DIAGNOSIS — E78.00 PURE HYPERCHOLESTEROLEMIA: ICD-10-CM

## 2021-11-19 DIAGNOSIS — Z71.89 ADVANCED CARE PLANNING/COUNSELING DISCUSSION: ICD-10-CM

## 2021-11-19 DIAGNOSIS — R73.03 PREDIABETES: ICD-10-CM

## 2021-11-19 DIAGNOSIS — Z86.718 HISTORY OF DVT (DEEP VEIN THROMBOSIS): ICD-10-CM

## 2021-11-19 PROCEDURE — 99396 PREV VISIT EST AGE 40-64: CPT | Performed by: INTERNAL MEDICINE

## 2021-11-19 PROCEDURE — 99214 OFFICE O/P EST MOD 30 MIN: CPT | Performed by: INTERNAL MEDICINE

## 2021-11-19 RX ORDER — ZOSTER VACCINE RECOMBINANT, ADJUVANTED 50 MCG/0.5
KIT INTRAMUSCULAR
Qty: 0.5 ML | Refills: 1 | Status: SHIPPED | OUTPATIENT
Start: 2021-11-19

## 2021-11-19 NOTE — PROGRESS NOTES
Almas Markham is a 63 y.o. male who was seen in clinic today (11/19/2021) for a full physical.          Assessment & Plan:   Below is the assessment and plan developed based on review of pertinent history, physical exam, labs, studies, and medications. 1. Routine physical examination  -     METABOLIC PANEL, COMPREHENSIVE; Future  -     CBC W/O DIFF; Future  -     LIPID PANEL; Future  -     PSA, DIAGNOSTIC (PROSTATE SPECIFIC AG); Future  -     HEMOGLOBIN A1C WITH EAG; Future  2. Advanced care planning/counseling discussion  3. Encounter for immunization  -     varicella-zoster recombinant, PF, (Shingrix, PF,) 50 mcg/0.5 mL susr injection; 0.5 ml IM once and then repeat in 2-6 months., Normal, Disp-0.5 mL, R-1  4. Pure hypercholesterolemia  Assessment & Plan:  at goal, continue current treatment pending review of labs    Orders:  -     METABOLIC PANEL, COMPREHENSIVE; Future  -     LIPID PANEL; Future  5. History of DVT (deep vein thrombosis)  Assessment & Plan:  Asymptomatic, no changes to medications as unprovoked   6. Prediabetes  Assessment & Plan:  Unclear control, likely stable, work on diet/weight, no indication for medication   Orders:  -     METABOLIC PANEL, COMPREHENSIVE; Future  -     HEMOGLOBIN A1C WITH EAG; Future  7. Obesity (BMI 30.0-34. 9)  Assessment & Plan:  stable, needs improvement, I have recommended the following interventions: encourage exercise and lifestyle education regarding diet. 8. Erectile dysfunction, unspecified erectile dysfunction type  Assessment & Plan:  Not using medications due to h/a, reviewed expectations and alternative medications, not an issue at this time so will defer any changes     Follow-up and Dispositions    · Return in about 1 year (around 11/19/2022) for FULL PHYSICAL - 30 minutes. Subjective:   Camelia Ramon is here today for a full physical.      Since last visit: has a new grandchild. Will be cutting back at work.   Hopes to start working out and taking care of himself. Would like to get down to 200 lbs. End of Life Planning  This was discussed with him today and he has NO advanced directive  - add't info provided. Reviewed DNR/DNI and patient is not interested. Health Maintenance  Immunizations:   Covid: up to date   Influenza: he will plan on getting it this fall   Tetanus: up to date   Shingles: due for Shingrix - script provided   Pneumonia: n/a   Cancer screening:     Prostate: guidelines reviewed, will do today   Colon: guidelines reviewed, up to date      The following acute and/or chronic problems were addressed today:    Cardiovascular Review  The patient has hyperlipidemia. Since last visit: no changes. He reports taking medications as instructed, no medication side effects noted. Diet and Lifestyle: generally follows a low fat low cholesterol diet. Labs: reviewed and discussed with patient.        Anticoagulation  Patient here for followup of chronic anticoagulation due to unprovoked DVT. Duration: life long. Bleeding Signs/Symptoms: None. Thromboembolic Signs/Symptoms: None. Recent falls:  None.       Endocrine Review  He is seen for pre-diabetes and obesity. Since last visit: no significant changes. Home glucose monitoring: is not performed. Diabetic diet compliance: compliant most of the time. Lab review: labs reviewed and discussed with patient.         The following sections were reviewed & updated as appropriate: Problem List, Allergies, PMH, PSH, FH, and SH. Prior to Admission medications    Medication Sig Start Date End Date Taking?  Authorizing Provider   lovastatin (MEVACOR) 20 mg tablet TAKE 1 TABLET BY MOUTH EVERYDAY AT BEDTIME 8/18/21  Yes Zulma Cummings MD   rivaroxaban (Xarelto) 20 mg tab tablet TAKE 1 TABLET BY MOUTH EVERY DAY 8/18/21  Yes Zulma Cummings MD   sildenafiL, pulmonary hypertension, (REVATIO) 20 mg tablet TAKE 1-5 TABLETS BY MOUTH ONCE DAILY AS NEEDED 30 MINUTES PRIOR TO INTERCOURSE 3/29/21 Yes Carolyn Garza MD   Cetirizine (ZYRTEC) 10 mg Cap Take 1 capsule by mouth as needed. Yes Provider, Historical   varicella-zoster recombinant, PF, (Shingrix, PF,) 50 mcg/0.5 mL susr injection 0.5 ml IM once and then repeat in 2-6 months. Patient not taking: Reported on 11/19/2021 3/2/21   Carolyn Garza MD          Review of Systems   Constitutional: Negative for chills and fever. Respiratory: Negative for cough and shortness of breath. Cardiovascular: Negative for chest pain and palpitations. Gastrointestinal: Negative for abdominal pain, blood in stool, constipation, diarrhea, heartburn, nausea and vomiting. Genitourinary:        He reports: weak stream and rare dribbling. Reports trouble getting an erection or maintaining an erection. Reports trouble with AM erections. Not using Viagra often due to h/a   Musculoskeletal: Negative for joint pain and myalgias. Neurological: Negative for tingling, focal weakness and headaches. Endo/Heme/Allergies: Does not bruise/bleed easily. Psychiatric/Behavioral: Negative for depression. The patient is not nervous/anxious and does not have insomnia. Objective:   Physical Exam  Constitutional:       General: He is not in acute distress. Appearance: He is well-developed. He is obese. HENT:      Right Ear: Tympanic membrane and ear canal normal.      Left Ear: Tympanic membrane and ear canal normal.   Eyes:      Conjunctiva/sclera: Conjunctivae normal.   Cardiovascular:      Rate and Rhythm: Regular rhythm. Heart sounds: Normal heart sounds. No murmur heard. Pulmonary:      Effort: Pulmonary effort is normal.      Breath sounds: Normal breath sounds. Abdominal:      General: Bowel sounds are normal.      Palpations: Abdomen is soft. Tenderness: There is no abdominal tenderness. Musculoskeletal:      Right lower leg: No edema. Left lower leg: No edema.    Lymphadenopathy:      Cervical: No cervical adenopathy.    Psychiatric:         Mood and Affect: Mood and affect normal.            Visit Vitals  /78   Pulse 66   Temp 97.5 °F (36.4 °C) (Temporal)   Resp 14   Ht 5' 10\" (1.778 m)   Wt 235 lb (106.6 kg)   SpO2 98%   BMI 33.72 kg/m²         Blanka Catherine MD

## 2021-11-19 NOTE — ACP (ADVANCE CARE PLANNING)
Advance Care Planning   Advance Care Planning (ACP) Physician/NP/PA (Provider) Conversation    Date of ACP Conversation: 11/19/21  Persons included in Conversation:  patient  Length of ACP Conversation in minutes: <16 minutes (Non-Billable)    Authorized Decision Maker (if patient is incapable of making informed decisions):   Next of Kin by law (only applies in absence of a Healthcare Power of  or Legal Guardian)      Primary Decision Maker: [address] [place], [place] [phone]    He has NO advanced directive - recommended completing forms. Reviewed DNR/DNI and patient is not interested- elects Full Code (attempt resuscitation).        Angela Ortiz MD

## 2021-11-19 NOTE — PATIENT INSTRUCTIONS
Well Visit, Men 48 to 72: Care Instructions  Overview     Well visits can help you stay healthy. Your doctor has checked your overall health and may have suggested ways to take good care of yourself. Your doctor also may have recommended tests. At home, you can help prevent illness with healthy eating, regular exercise, and other steps. Follow-up care is a key part of your treatment and safety. Be sure to make and go to all appointments, and call your doctor if you are having problems. It's also a good idea to know your test results and keep a list of the medicines you take. How can you care for yourself at home? · Get screening tests that you and your doctor decide on. Screening helps find diseases before any symptoms appear. · Eat healthy foods. Choose fruits, vegetables, whole grains, protein, and low-fat dairy foods. Limit fat, especially saturated fat. Reduce salt in your diet. · Limit alcohol. Have no more than 2 drinks a day or 14 drinks a week. · Get at least 30 minutes of exercise on most days of the week. Walking is a good choice. You also may want to do other activities, such as running, swimming, cycling, or playing tennis or team sports. · Reach and stay at a healthy weight. This will lower your risk for many problems, such as obesity, diabetes, heart disease, and high blood pressure. · Do not smoke. Smoking can make health problems worse. If you need help quitting, talk to your doctor about stop-smoking programs and medicines. These can increase your chances of quitting for good. · Care for your mental health. It is easy to get weighed down by worry and stress. Learn strategies to manage stress, like deep breathing and mindfulness, and stay connected with your family and community. If you find you often feel sad or hopeless, talk with your doctor. Treatment can help. · Talk to your doctor about whether you have any risk factors for sexually transmitted infections (STIs).  You can help prevent STIs if you wait to have sex with a new partner (or partners) until you've each been tested for STIs. It also helps if you use condoms (male or female condoms) and if you limit your sex partners to one person who only has sex with you. Vaccines are available for some STIs. · If it's important to you to prevent pregnancy with your partner, talk with your doctor about birth control options that might be best for you. · If you think you may have a problem with alcohol or drug use, talk to your doctor. This includes prescription medicines (such as amphetamines and opioids) and illegal drugs (such as cocaine and methamphetamine). Your doctor can help you figure out what type of treatment is best for you. · Protect your skin from too much sun. When you're outdoors from 10 a.m. to 4 p.m., stay in the shade or cover up with clothing and a hat with a wide brim. Wear sunglasses that block UV rays. Even when it's cloudy, put broad-spectrum sunscreen (SPF 30 or higher) on any exposed skin. · See a dentist one or two times a year for checkups and to have your teeth cleaned. · Wear a seat belt in the car. When should you call for help? Watch closely for changes in your health, and be sure to contact your doctor if you have any problems or symptoms that concern you. Where can you learn more? Go to http://dani-adelita.info/  Enter J637 in the search box to learn more about \"Well Visit, Men 48 to 72: Care Instructions. \"  Current as of: February 11, 2021               Content Version: 13.0  © 2006-2021 Healthwise[employer]. Care instructions adapted under license by [employer] (which disclaims liability or warranty for this information). If you have questions about a medical condition or this instruction, always ask your healthcare professional. [address] any warranty or liability for your use of this information.

## 2021-11-19 NOTE — ASSESSMENT & PLAN NOTE
Not using medications due to h/a, reviewed expectations and alternative medications, not an issue at this time so will defer any changes

## 2021-12-08 LAB
ALBUMIN SERPL-MCNC: 4 G/DL (ref 3.8–4.8)
ALBUMIN/GLOB SERPL: 1.5 {RATIO} (ref 1.2–2.2)
ALP SERPL-CCNC: 65 IU/L (ref 44–121)
ALT SERPL-CCNC: 28 IU/L (ref 0–44)
AST SERPL-CCNC: 21 IU/L (ref 0–40)
BILIRUB SERPL-MCNC: 0.6 MG/DL (ref 0–1.2)
BUN SERPL-MCNC: 14 MG/DL (ref 8–27)
BUN/CREAT SERPL: 15 (ref 10–24)
CALCIUM SERPL-MCNC: 9.3 MG/DL (ref 8.6–10.2)
CHLORIDE SERPL-SCNC: 103 MMOL/L (ref 96–106)
CHOLEST SERPL-MCNC: 177 MG/DL (ref 100–199)
CO2 SERPL-SCNC: 25 MMOL/L (ref 20–29)
CREAT SERPL-MCNC: 0.92 MG/DL (ref 0.76–1.27)
ERYTHROCYTE [DISTWIDTH] IN BLOOD BY AUTOMATED COUNT: 13.2 % (ref 11.6–15.4)
EST. AVERAGE GLUCOSE BLD GHB EST-MCNC: 131 MG/DL
GLOBULIN SER CALC-MCNC: 2.7 G/DL (ref 1.5–4.5)
GLUCOSE SERPL-MCNC: 100 MG/DL (ref 65–99)
HBA1C MFR BLD: 6.2 % (ref 4.8–5.6)
HCT VFR BLD AUTO: 47.2 % (ref 37.5–51)
HDLC SERPL-MCNC: 66 MG/DL
HGB BLD-MCNC: 16.1 G/DL (ref 13–17.7)
LDLC SERPL CALC-MCNC: 96 MG/DL (ref 0–99)
MCH RBC QN AUTO: 30 PG (ref 26.6–33)
MCHC RBC AUTO-ENTMCNC: 34.1 G/DL (ref 31.5–35.7)
MCV RBC AUTO: 88 FL (ref 79–97)
PLATELET # BLD AUTO: 187 X10E3/UL (ref 150–450)
POTASSIUM SERPL-SCNC: 4.7 MMOL/L (ref 3.5–5.2)
PROT SERPL-MCNC: 6.7 G/DL (ref 6–8.5)
PSA SERPL-MCNC: 1.5 NG/ML (ref 0–4)
RBC # BLD AUTO: 5.36 X10E6/UL (ref 4.14–5.8)
SODIUM SERPL-SCNC: 140 MMOL/L (ref 134–144)
TRIGL SERPL-MCNC: 83 MG/DL (ref 0–149)
VLDLC SERPL CALC-MCNC: 15 MG/DL (ref 5–40)
WBC # BLD AUTO: 4.4 X10E3/UL (ref 3.4–10.8)

## 2021-12-08 NOTE — PROGRESS NOTES
Results released to patient via [employer]. All labs are stable or at goal for him. FBS stable, A1c worse, life style changes & weight loss.

## 2021-12-15 NOTE — PROGRESS NOTES
Called patient, advised of mychart message. Also, that wellness form was ready for . He will come by tomorrow.

## 2022-02-05 DIAGNOSIS — I82.811 SUPERFICIAL THROMBOSIS OF RIGHT LOWER EXTREMITY: ICD-10-CM

## 2022-02-05 DIAGNOSIS — Z86.718 HISTORY OF DVT (DEEP VEIN THROMBOSIS): ICD-10-CM

## 2022-02-20 DIAGNOSIS — E78.00 PURE HYPERCHOLESTEROLEMIA: ICD-10-CM

## 2022-02-20 RX ORDER — LOVASTATIN 20 MG/1
TABLET ORAL
Qty: 90 TABLET | Refills: 2 | Status: SHIPPED | OUTPATIENT
Start: 2022-02-20

## 2022-03-19 PROBLEM — R73.03 PREDIABETES: Status: ACTIVE | Noted: 2018-01-28

## 2022-03-19 PROBLEM — N52.9 ERECTILE DYSFUNCTION, UNSPECIFIED ERECTILE DYSFUNCTION TYPE: Status: ACTIVE | Noted: 2021-11-19

## 2022-03-19 PROBLEM — Z86.718 HISTORY OF DVT (DEEP VEIN THROMBOSIS): Status: ACTIVE | Noted: 2018-01-29

## 2022-07-20 ENCOUNTER — TELEPHONE (OUTPATIENT)
Dept: INTERNAL MEDICINE CLINIC | Age: 62
End: 2022-07-20

## 2022-07-20 ENCOUNTER — E-VISIT (OUTPATIENT)
Dept: INTERNAL MEDICINE CLINIC | Age: 62
End: 2022-07-20
Payer: COMMERCIAL

## 2022-07-20 DIAGNOSIS — U07.1 COVID-19: Primary | ICD-10-CM

## 2022-07-20 PROCEDURE — 99421 OL DIG E/M SVC 5-10 MIN: CPT | Performed by: INTERNAL MEDICINE

## 2022-07-20 NOTE — PROGRESS NOTES
E-Visit Note:    HPI: per patient questionnaire, this has been reviewed  EXAM: n/a    ----------------------------------  Diagnoses and all orders for this visit:    1. COVID-19         PLAN:   Will offer anti-viral.  See MyChart response.    ----------------------------------  The patient was advised to call if these symptoms worsen or fail to improve as anticipated. 5-10 minutes were spent on the digital evaluation and management of this patient.        Jatin Garcia MD

## 2022-07-20 NOTE — TELEPHONE ENCOUNTER
Reason for call: The patient has COVID and has a fever.  Please call back     Is this a new problem: yes     Date of last appointment:  11/19/2021     Can we respond via [employer]: no    Best call back number: claudio [phone]

## 2022-07-21 ENCOUNTER — TELEPHONE (OUTPATIENT)
Dept: INTERNAL MEDICINE CLINIC | Age: 62
End: 2022-07-21

## 2022-07-21 DIAGNOSIS — U07.1 COVID-19: Primary | ICD-10-CM

## 2022-07-21 NOTE — TELEPHONE ENCOUNTER
E-visit completed yesterday. Pt sent 3 or 4 [employer] messages but he did not send them to me, he sent them to the [facility]. Please educate him to make sure he sends his messages to me, which is why I didn't see them. Medication has been sent in. He needs to stop his statin for the next 10 days (5 days while on medication and 5 days afterwards). No viagra for the next 10 days either. Regarding beach vacation the guidelines are to quarantine for 5 days and can break if asymptomatic. If still symptomatic needs to extend to 10 days. My recommendation with 2 young grandchildren is not to risk exposing them.

## 2022-07-21 NOTE — TELEPHONE ENCOUNTER
Notified wife per MD note. Understanding verbalized. Wife explained she only replied to the [address] sent by Dr. Yoni Gutierrez. There was no drop down to select provider.

## 2022-07-21 NOTE — TELEPHONE ENCOUNTER
Reason for call:   Patient wife called. Patient has covid and completed e-visit yesterday. Wants to know if Paxlovid can be called in.     Is this a new problem: yes     Date of last appointment:  11/19/2021     Can we respond via [employer]: no    Best call back number:       Alecia Snell - [phone]

## 2022-11-22 ENCOUNTER — OFFICE VISIT (OUTPATIENT)
Dept: INTERNAL MEDICINE CLINIC | Age: 62
End: 2022-11-22
Payer: COMMERCIAL

## 2022-11-22 VITALS
TEMPERATURE: 98.2 F | BODY MASS INDEX: 32.78 KG/M2 | OXYGEN SATURATION: 97 % | HEART RATE: 60 BPM | HEIGHT: 70 IN | DIASTOLIC BLOOD PRESSURE: 83 MMHG | WEIGHT: 229 LBS | SYSTOLIC BLOOD PRESSURE: 124 MMHG | RESPIRATION RATE: 18 BRPM

## 2022-11-22 DIAGNOSIS — Z00.00 ROUTINE PHYSICAL EXAMINATION: Primary | ICD-10-CM

## 2022-11-22 DIAGNOSIS — E66.9 OBESITY (BMI 30.0-34.9): ICD-10-CM

## 2022-11-22 DIAGNOSIS — I83.91 ASYMPTOMATIC VARICOSE VEINS OF RIGHT LOWER EXTREMITY: ICD-10-CM

## 2022-11-22 DIAGNOSIS — E78.00 PURE HYPERCHOLESTEROLEMIA: ICD-10-CM

## 2022-11-22 DIAGNOSIS — Z71.89 ADVANCED CARE PLANNING/COUNSELING DISCUSSION: ICD-10-CM

## 2022-11-22 DIAGNOSIS — N52.9 ERECTILE DYSFUNCTION, UNSPECIFIED ERECTILE DYSFUNCTION TYPE: ICD-10-CM

## 2022-11-22 DIAGNOSIS — R73.03 PREDIABETES: ICD-10-CM

## 2022-11-22 DIAGNOSIS — Z86.718 HISTORY OF DVT (DEEP VEIN THROMBOSIS): ICD-10-CM

## 2022-11-22 PROCEDURE — 99396 PREV VISIT EST AGE 40-64: CPT | Performed by: INTERNAL MEDICINE

## 2022-11-22 RX ORDER — SILDENAFIL CITRATE 20 MG/1
TABLET ORAL
Qty: 30 TABLET | Refills: 0 | Status: SHIPPED | OUTPATIENT
Start: 2022-11-22

## 2022-11-22 NOTE — ASSESSMENT & PLAN NOTE
New dx to me, chronic issue, reviewed elevation & compression socks. He was asking about procedures and reviewed pros/cons. Likely due to previous DVT so unlikely to improve.   Agree w/ discussion with specialists

## 2022-11-22 NOTE — PROGRESS NOTES
William Smith is a 57 y.o. male who was seen in clinic today (11/22/2022) for a full physical.          Assessment & Plan:   Below is the assessment and plan developed based on review of pertinent history, physical exam, labs, studies, and medications. 1. Routine physical examination  -     METABOLIC PANEL, COMPREHENSIVE; Future  -     CBC W/O DIFF; Future  -     LIPID PANEL; Future  -     PSA, DIAGNOSTIC (PROSTATE SPECIFIC AG); Future  -     HEMOGLOBIN A1C WITH EAG; Future  2. Advanced care planning/counseling discussion  3. Pure hypercholesterolemia  Assessment & Plan:  previously at goal, continue current treatment pending review of labs    Orders:  -     METABOLIC PANEL, COMPREHENSIVE; Future  -     LIPID PANEL; Future  4. Prediabetes  Assessment & Plan:  Hopefully improved w/ weight loss, continue to work on diet/weight, will check labs   Orders:  -     METABOLIC PANEL, COMPREHENSIVE; Future  -     HEMOGLOBIN A1C WITH EAG; Future  5. Obesity (BMI 30.0-34. 9)  Assessment & Plan:  improved, congratulated, but still needs improvement. I have recommended the following interventions: encourage exercise and lifestyle education regarding diet. 6. History of DVT (deep vein thrombosis)  Assessment & Plan:  stable, continue current treatment    7. Erectile dysfunction, unspecified erectile dysfunction type  Assessment & Plan:  Stable, continue with medication, reviewed we can try Cialis but he wants to stick w/ current med   Orders:  -     sildenafiL (REVATIO) 20 mg tablet; TAKE 1-5 TABLETS BY MOUTH ONCE DAILY AS NEEDED 30 MINUTES PRIOR TO INTERCOURSE, Normal, Disp-30 Tablet, R-0  8. Asymptomatic varicose veins of right lower extremity  Assessment & Plan:  New dx to me, chronic issue, reviewed elevation & compression socks. He was asking about procedures and reviewed pros/cons. Likely due to previous DVT so unlikely to improve.   Agree w/ discussion with specialists     Follow-up and Dispositions    Return in about 1 year (around 11/22/2023) for FULL PHYSICAL. Subjective:   Melly Auguste is here today for a full physical.      Since last visit: weight has decreased. End of Life Planning  This was discussed with him today and he has NO advanced directive  - add't info provided. Reviewed DNR/DNI and patient is not interested. Health Maintenance  Immunizations:   Covid: not up to date - declined   Influenza: up to date   Tetanus: up to date   Shingles: due for Shingrix - he will consider   Pneumonia: n/a   Cancer screening:     Prostate: guidelines reviewed, will do today   Colon: guidelines reviewed, up to date      The following acute and/or chronic problems were addressed today:    Cardiovascular Review  The patient has hyperlipidemia. Since last visit: no changes. He reports taking medications as instructed, no medication side effects noted. Diet and Lifestyle: generally follows a low fat low cholesterol diet. Labs: reviewed and discussed with patient. Anticoagulation  Patient here for followup of chronic anticoagulation due to unprovoked DVT. Duration: life long. Bleeding Signs/Symptoms: None. Thromboembolic Signs/Symptoms: None. Recent falls:  None. Endocrine Review  He is seen for pre-diabetes and obesity. Since last visit: weight has decreased. Home glucose monitoring: is not performed. Diabetic diet compliance: compliant most of the time. Lab review: labs reviewed and discussed with patient. The following sections were reviewed & updated as appropriate: Problem List, Allergies, PMH, PSH, FH, and SH. Prior to Admission medications    Medication Sig Start Date End Date Taking?  Authorizing Provider   lovastatin (MEVACOR) 20 mg tablet TAKE 1 TABLET BY MOUTH EVERYDAY AT BEDTIME 2/20/22  Yes Ralph Darden MD   rivaroxaban (Xarelto) 20 mg tab tablet TAKE 1 TABLET BY MOUTH EVERY DAY 2/6/22  Yes Ralph Darden MD   sildenafiL, pulmonary hypertension, (REVATIO) 20 mg tablet TAKE 1-5 TABLETS BY MOUTH ONCE DAILY AS NEEDED 30 MINUTES PRIOR TO INTERCOURSE 3/29/21  Yes Cherylene Stark, MD   Cetirizine 10 mg cap Take 1 capsule by mouth as needed. Yes Provider, Historical   nirmatrelvir-ritonavir (PAXLOVID) 150 mg x 2- 100 mg tablet Take 3 Tablets by mouth every twelve (12) hours. 7/21/22 11/22/22  Cherylene Stark, MD   varicella-zoster recombinant, PF, (Shingrix, PF,) 50 mcg/0.5 mL susr injection 0.5 ml IM once and then repeat in 2-6 months. Patient not taking: Reported on 11/22/2022 11/19/21   Cherylene Stark, MD          Review of Systems   Constitutional:  Negative for chills and fever. Respiratory:  Negative for cough and shortness of breath. Cardiovascular:  Negative for chest pain and palpitations. Gastrointestinal:  Negative for abdominal pain, blood in stool, constipation, diarrhea, heartburn, nausea and vomiting. Genitourinary:         He reports: weak stream         Reports trouble getting an erection or maintaining an erection. Reports trouble with AM erections. Not using Viagra often due to h/a   Musculoskeletal:  Negative for joint pain and myalgias. Neurological:  Negative for tingling, focal weakness and headaches. Endo/Heme/Allergies:  Does not bruise/bleed easily. Psychiatric/Behavioral:  Negative for depression. The patient is not nervous/anxious and does not have insomnia. Objective:   Physical Exam  Constitutional:       General: He is not in acute distress. Appearance: He is well-developed. He is obese. HENT:      Right Ear: Tympanic membrane and ear canal normal.      Left Ear: Tympanic membrane and ear canal normal.   Eyes:      Conjunctiva/sclera: Conjunctivae normal.   Cardiovascular:      Rate and Rhythm: Regular rhythm. Heart sounds: Normal heart sounds. No murmur heard. Pulmonary:      Effort: Pulmonary effort is normal.      Breath sounds: Normal breath sounds.    Abdominal:      General: Bowel sounds are normal.      Palpations: Abdomen is soft. Tenderness: There is no abdominal tenderness. Musculoskeletal:      Right lower leg: No edema. Left lower leg: No edema. Lymphadenopathy:      Cervical: No cervical adenopathy.    Psychiatric:         Mood and Affect: Mood and affect normal.          Visit Vitals  /83   Pulse 60   Temp 98.2 °F (36.8 °C) (Temporal)   Resp 18   Ht 5' 10\" (1.778 m)   Wt 229 lb (103.9 kg)   SpO2 97%   BMI 32.86 kg/m²         Brian Cevallos MD

## 2022-11-22 NOTE — ASSESSMENT & PLAN NOTE
improved, congratulated, but still needs improvement. I have recommended the following interventions: encourage exercise and lifestyle education regarding diet.

## 2022-11-22 NOTE — ACP (ADVANCE CARE PLANNING)
Advance Care Planning   Advance Care Planning (ACP) Physician/NP/PA (Provider) Conversation    Date of ACP Conversation: 11/22/22  Persons included in Conversation:  patient  Length of ACP Conversation in minutes: <16 minutes (Non-Billable)    Authorized Decision Maker (if patient is incapable of making informed decisions):   Next of Kin by law (only applies in absence of a Healthcare Power of  or Legal Guardian)      Primary Decision Maker: [address] [place] [address] [phone]    He has NO advanced directive - recommended completing forms. Reviewed DNR/DNI and patient is not interested- elects Full Code (attempt resuscitation).        Franklin Brown MD

## 2022-12-08 ENCOUNTER — OFFICE VISIT (OUTPATIENT)
Dept: ORTHOPEDIC SURGERY | Age: 62
End: 2022-12-08
Payer: COMMERCIAL

## 2022-12-08 VITALS — BODY MASS INDEX: 32.21 KG/M2 | HEIGHT: 70 IN | WEIGHT: 225 LBS

## 2022-12-08 DIAGNOSIS — M25.552 LEFT HIP PAIN: Primary | ICD-10-CM

## 2022-12-08 PROCEDURE — 99204 OFFICE O/P NEW MOD 45 MIN: CPT | Performed by: ORTHOPAEDIC SURGERY

## 2022-12-08 RX ORDER — CETIRIZINE HYDROCHLORIDE 10 MG/1
CAPSULE, LIQUID FILLED ORAL
COMMUNITY

## 2022-12-09 RX ORDER — METHYLPREDNISOLONE 4 MG/1
4 TABLET ORAL
Qty: 1 DOSE PACK | Refills: 0 | Status: SHIPPED | OUTPATIENT
Start: 2022-12-09

## 2022-12-20 NOTE — PROGRESS NOTES
Jesus Treadwell (: 1960) is a 57 y.o. male patient, here for evaluation of the following chief complaint(s):  Hip Pain (Left hip pain/)       ASSESSMENT/PLAN:  Below is the assessment and plan developed based on review of pertinent history, physical exam, labs, studies, and medications. 58-year-old male comes in today for evaluation of left hip pain. Has been intermittently bothering him over the last multiple years, slowly continues to worsen. Pain is located in his anterior groin. Pain and difficulty with putting on socks socks and shoes. Has to lift his leg to get in and out of a car. Rates his pain as moderate. X-rays today show moderate to severe osteoarthritic changes noted to the left hip. Symptoms likely secondary to osteoarthritic flare. Discussed findings with patient. At this point he would like to continue with conservative treatment, prescription given for a steroid Dosepak. Risks and benefits of medication discussed with patient. Patient verbalized understanding. He is on Xarelto secondary to history of a DVT in his right leg years ago, unable to take NSAIDs. Patient plans to monitor symptoms and follow-up as needed. Patient's daughter was a nurse on the Ortho floor at [facility].      1. Left hip pain  -     XR HIP LT W OR WO PELV 2-3 VWS; Future      Encounter Diagnosis   Name Primary? Left hip pain Yes        No follow-ups on file. SUBJECTIVE/OBJECTIVE:  Jesus Treadwell (: 1960) is a 57 y.o. male who presents today for the following:  Chief Complaint   Patient presents with    Hip Pain     Left hip pain         58-year-old male comes in today for evaluation of left hip pain. Has been intermittently bothering him over the last multiple years, slowly continues to worsen. Pain is located in his anterior groin. Pain and difficulty with putting on socks socks and shoes. Has to lift his leg to get in and out of a car.   Rates his pain as moderate. IMAGING:  XR Results (most recent):  Results from Appointment encounter on 12/08/22    XR HIP LT W OR WO PELV 2-3 VWS    Narrative  3 views left hip x-rays ordered and personally reviewed. Patient with moderate osteoarthritic changes noted to the left hip including joint space narrowing and subchondral sclerosis. Cam lesion present. Allergies   Allergen Reactions    Codeine Rash and Itching       Current Outpatient Medications   Medication Sig    methylPREDNISolone (MEDROL DOSEPACK) 4 mg tablet Take 1 Tablet by mouth Specific Days and Specific Times. Per dose pack instructions    Cetirizine (ZyrTEC) 10 mg cap Take  by mouth.    lovastatin (MEVACOR) 20 mg tablet TAKE 1 TABLET BY MOUTH EVERYDAY AT BEDTIME    rivaroxaban (Xarelto) 20 mg tab tablet TAKE 1 TABLET BY MOUTH EVERY DAY     No current facility-administered medications for this visit. Past Medical History:   Diagnosis Date    DVT (deep venous thrombosis) ([facility] [address] 11/20/2013    Right leg     Hyperlipidemia     Influenza B 3/22/2017    [facility] - 3/22/17    Osteomyelitis of left wrist ([facility] [address] 1975    Skin cancer     BCC and SCC    Squamous cell carcinoma in situ of skin     Left shin. S/p Mohs surgery 3/5/14     Superficial thrombosis of leg 11/8/2013    Recurrent.   Workup by oncologist ('11) was negative for hypercoagulable state & cancer         Past Surgical History:   Procedure Laterality Date    AMB POC MOHS 1 STAGE T/A/L      COLONOSCOPY  6/22/11    Normal- repeat 10yrs    HX COLONOSCOPY  07/27/2021    polyp x 3; referred to colorectal for anail papilliae    HX MOHS PROCEDURES  03/05/2014    L shin    HX MOHS PROCEDURES  11/16/2016    L shin    HX MOHS PROCEDURES  02/15/2017    SCC right anterior shin and right posterior calf    HX MOHS PROCEDURES  10/24/2017    SCC R posterior leg by Dr. Jaz Palencia MOHS PROCEDURES  02/19/2018    SCC L anterior lower leg by Dr. Glendy Nagel        Family History   Problem Relation Age of Onset Coronary Art Dis Father         MI at 54, s/p CABG    Dementia Father     Cancer Mother         bile duct cancer/liver    No Known Problems Sister     Other Brother         h/o DVT's    No Known Problems Daughter     Other Daughter         prothrombin gene mutation    Cancer Maternal Aunt         colon    Heart Disease Paternal Aunt     Heart Disease Paternal Uncle     Diabetes Maternal Grandmother         Social History     Tobacco Use    Smoking status: Never    Smokeless tobacco: Never   Substance Use Topics    Alcohol use: Yes     Comment: 1 beer every 2 weeks        All systems reviewed x 12 and were negative with the exception of None      No flowsheet data found. Vitals:  Ht 5' 10\" (1.778 m)   Wt 225 lb (102.1 kg)   BMI 32.28 kg/m²    Body mass index is 32.28 kg/m². Physical Exam    General: NAD, well developed, well nourished. Cardiac: Extremities well perfused. Respiratory: Nonlabored breathing. RLE: No antalgic gait. Negative stinchfield. 0-100 degrees of flexion. >20 degrees internal rotation, >30 degrees external rotation. Negative TREVOR. No pain with flexion adduction and internal rotation. .  Motor strength grossly intact. LLE: Mild antalgic gait. Positive stinchfield. 0-100 degrees of flexion. >20 degrees internal rotation, >30 degrees external rotation. Negative TREVOR. Positive for pain with flexion adduction and internal rotation. .  Motor strength grossly intact. Skin: Warm well perfused. Vascular: Palpable pedal pulses bilaterally. Equal. Capillary refill less than 2 seconds. Rey Stafford M.D. was available for immediate consultation as the supervising physician. An electronic signature was used to authenticate this note.   -- Roxann Andre PA-C

## 2023-01-11 DIAGNOSIS — Z86.718 HISTORY OF DVT (DEEP VEIN THROMBOSIS): ICD-10-CM

## 2023-01-11 DIAGNOSIS — I82.811 SUPERFICIAL THROMBOSIS OF RIGHT LOWER EXTREMITY: ICD-10-CM

## 2023-01-12 ENCOUNTER — OFFICE VISIT (OUTPATIENT)
Dept: ORTHOPEDIC SURGERY | Age: 63
End: 2023-01-12
Payer: COMMERCIAL

## 2023-01-12 VITALS — WEIGHT: 225 LBS | BODY MASS INDEX: 32.21 KG/M2 | HEIGHT: 70 IN

## 2023-01-12 DIAGNOSIS — M16.12 ARTHRITIS OF LEFT HIP: Primary | ICD-10-CM

## 2023-01-12 PROCEDURE — 99215 OFFICE O/P EST HI 40 MIN: CPT | Performed by: ORTHOPAEDIC SURGERY

## 2023-01-12 NOTE — PROGRESS NOTES
Claudetta Artis (: 1960) is a 60 y.o. male patient, here for evaluation of the following chief complaint(s):  Hip Pain (Left hip pain/)       ASSESSMENT/PLAN:  Below is the assessment and plan developed based on review of pertinent history, physical exam, labs, studies, and medications. 79-year-old male comes in today for follow-up. We have seen him in the past for left-sided hip pain. He has femoral acetabular impingement with secondary osteoarthritis. He has been attempting conservative treatment including therapy medication and activity modification. The pain is when worsening. He said he is now having pain daily that is rated as moderate. He has moderate pain walking on uneven surface as well as with stairs. He has moderate pain rising from sitting and bending over. He has difficulty putting on his shoes. He came in today to discuss surgery considering his pain is worsening and has been going on for more than a year. He has a history of a blood clot about 11 years ago. He takes Xarelto for this. We discussed his elevated risk for blood clots because of this. He says he has a prothrombin deficiency. He is going to talk to his primary care doctor about potentially stopping his Xarelto and doing Lovenox until 24 hours before the surgery. I am okay with this if they decide to do this. He will also need primary care clearance. We will plan to get this scheduled at [facility] at his convenience. He understands elevated risk because of his BMI as well. 1. Arthritis of left hip      Encounter Diagnosis   Name Primary? Arthritis of left hip Yes        No follow-ups on file. SUBJECTIVE/OBJECTIVE:  Claudetta Artis (: 1960) is a 60 y.o. male who presents today for the following:  Chief Complaint   Patient presents with    Hip Pain     Left hip pain         79-year-old male comes in today for follow-up. We have seen him in the past for left-sided hip pain. He has femoral acetabular impingement with secondary osteoarthritis. He has been attempting conservative treatment including therapy medication and activity modification. The pain is when worsening. He said he is now having pain daily that is rated as moderate. He has moderate pain walking on uneven surface as well as with stairs. He has moderate pain rising from sitting and bending over. He has difficulty putting on his shoes. He came in today to discuss surgery considering his pain is worsening and has been going on for more than a year. He has a history of a blood clot about 11 years ago. He takes Xarelto for this. IMAGING:  XR Results (most recent):  Results from Appointment encounter on 12/08/22    XR HIP LT W OR WO PELV 2-3 VWS    Narrative  3 views left hip x-rays ordered and personally reviewed. Patient with moderate osteoarthritic changes noted to the left hip including joint space narrowing and subchondral sclerosis. Cam lesion present. Allergies   Allergen Reactions    Codeine Rash and Itching       Current Outpatient Medications   Medication Sig    rivaroxaban (Xarelto) 20 mg tab tablet TAKE 1 TABLET BY MOUTH EVERY DAY    Cetirizine (ZyrTEC) 10 mg cap Take  by mouth.    lovastatin (MEVACOR) 20 mg tablet TAKE 1 TABLET BY MOUTH EVERYDAY AT BEDTIME     No current facility-administered medications for this visit. Past Medical History:   Diagnosis Date    DVT (deep venous thrombosis) ([address] 11/20/2013    Right leg     Hyperlipidemia     Influenza B 3/22/2017    Pt First - 3/22/17    Osteomyelitis of left wrist ([facility] [address] 1975    Skin cancer     BCC and SCC    Squamous cell carcinoma in situ of skin     Left shin. S/p Mohs surgery 3/5/14     Superficial thrombosis of leg 11/8/2013    Recurrent.   Workup by oncologist ('11) was negative for hypercoagulable state & cancer         Past Surgical History:   Procedure Laterality Date    AMB POC MOHS 1 STAGE T/A/L      COLONOSCOPY  6/22/11 Normal- repeat 10yrs    HX COLONOSCOPY  07/27/2021    polyp x 3; referred to colorectal for anail papilliae    HX MOHS PROCEDURES  03/05/2014    L shin    HX MOHS PROCEDURES  11/16/2016    L shin    HX MOHS PROCEDURES  02/15/2017    SCC right anterior shin and right posterior calf    HX MOHS PROCEDURES  10/24/2017    SCC R posterior leg by Dr. Garrel Litten [id number]  02/19/2018    SCC L anterior lower leg by Dr. Ana Montes        Family History   Problem Relation Age of Onset    Coronary Art Dis Father         MI at 54, s/p CABG    Dementia Father     Cancer Mother         bile duct cancer/liver    No Known Problems Sister     Other Brother         h/o DVT's    No Known Problems Daughter     Other Daughter         prothrombin gene mutation    Cancer Maternal Aunt         colon    Heart Disease Paternal Aunt     Heart Disease Paternal Uncle     Diabetes Maternal Grandmother         Social History     Tobacco Use    Smoking status: Never    Smokeless tobacco: Never   Substance Use Topics    Alcohol use: Yes     Comment: 1 beer every 2 weeks        All systems reviewed x 12 and were negative with the exception of None      No flowsheet data found. Vitals:  Ht 5' 10\" (1.778 m)   Wt 225 lb (102.1 kg)   BMI 32.28 kg/m²    Body mass index is 32.28 kg/m². Physical Exam    General: NAD, well developed, well nourished. Cardiac: Extremities well perfused. Respiratory: Nonlabored breathing. RLE: No antalgic gait. Negative stinchfield. 0-100 degrees of flexion. >20 degrees internal rotation, >30 degrees external rotation. Negative TREVOR. No pain with flexion adduction and internal rotation. .  Motor strength grossly intact. LLE: Mild antalgic gait. Positive stinchfield. 0-100 degrees of flexion. >20 degrees internal rotation, >30 degrees external rotation. Negative TREVOR.  pain with flexion adduction and internal rotation. .  Motor strength grossly intact. Skin: Warm well perfused.       Vascular: Palpable pedal pulses bilaterally. Equal. Capillary refill less than 2 seconds. An electronic signature was used to authenticate this note.   -- Miguelito Vance MD

## 2023-01-12 NOTE — TELEPHONE ENCOUNTER
Future Appointments   Date Time Provider Bjorn Craft   1/12/2023  4:00 PM Mudrick, Kathee Nageotte, MD [facility] AM[facility]   12/1/2023  8:00 AM Bhavin Liz MD [facility] AMB

## 2023-05-29 RX ORDER — CETIRIZINE HYDROCHLORIDE 10 MG/1
CAPSULE, LIQUID FILLED ORAL
COMMUNITY

## 2023-05-29 RX ORDER — LOVASTATIN 20 MG/1
TABLET ORAL
COMMUNITY
Start: 2023-04-07

## 2023-12-01 ENCOUNTER — TELEPHONE (OUTPATIENT)
Age: 63
End: 2023-12-01

## 2023-12-01 NOTE — TELEPHONE ENCOUNTER
Dr. Jose Alejandro Harris printed Missed Atilio Letter on 12/1/23. Letter has been mailed to patient.  [address]

## 2024-01-09 DIAGNOSIS — I82.811 EMBOLISM AND THROMBOSIS OF SUPERFICIAL VEINS OF RIGHT LOWER EXTREMITY: ICD-10-CM

## 2024-01-09 DIAGNOSIS — Z86.718 PERSONAL HISTORY OF OTHER VENOUS THROMBOSIS AND EMBOLISM: ICD-10-CM

## 2024-01-10 RX ORDER — RIVAROXABAN 20 MG/1
20 TABLET, FILM COATED ORAL DAILY
Qty: 90 TABLET | Refills: 1 | Status: SHIPPED | OUTPATIENT
Start: 2024-01-10

## 2024-01-10 NOTE — TELEPHONE ENCOUNTER
Was a no show to last visit.  Has CPE scheduled for July.    Future Appointments   Date Time Provider Department Center   7/1/2024  3:00 PM Edison Martin MD [facility]

## 2024-01-16 DIAGNOSIS — E78.00 PURE HYPERCHOLESTEROLEMIA, UNSPECIFIED: ICD-10-CM

## 2024-01-16 RX ORDER — LOVASTATIN 20 MG/1
TABLET ORAL
Qty: 30 TABLET | Refills: 0 | Status: SHIPPED | OUTPATIENT
Start: 2024-01-16

## 2024-01-17 NOTE — TELEPHONE ENCOUNTER
Was a no show to physical last month.  He rescheduled until July '24.  That is to far out.  Needs to reschedule for a regular check up this month for exam and labs.    30 days sent in.

## 2024-01-30 DIAGNOSIS — E78.00 PURE HYPERCHOLESTEROLEMIA, UNSPECIFIED: ICD-10-CM

## 2024-01-30 RX ORDER — LOVASTATIN 20 MG/1
TABLET ORAL
Qty: 30 TABLET | Refills: 1 | OUTPATIENT
Start: 2024-01-30

## 2024-01-30 NOTE — TELEPHONE ENCOUNTER
Medication Refill Request    Thomas Lam Jr is requesting a refill of the following medication(s):   lovastatin (MEVACOR) 20 MG tablet     rivaroxaban (XARELTO) 20 MG TABS tablet             Please send refill to:     [facility]/pharmacy #[id number] - [place], [place] - [address] [address] [phone] - F [phone] [address] [address]  Phone: [phone] Fax: [phone]       Pt is out of Xarelto. Pt would like this medication called into pharmacy today if at all possible. Pt has appt with Dr. Martin' first available, 03/27/24.

## 2024-01-30 NOTE — TELEPHONE ENCOUNTER
Chief Complaint   Patient presents with    Medication Refill     Last Appointment with Dr. Edison Martin:  11/22/22    Future Appointments   Date Time Provider Department Center   3/27/2024  1:20 PM Edison Martin MD [facility]

## 2024-02-12 DIAGNOSIS — E78.00 PURE HYPERCHOLESTEROLEMIA, UNSPECIFIED: ICD-10-CM

## 2024-02-12 RX ORDER — LOVASTATIN 20 MG/1
TABLET ORAL
Qty: 30 TABLET | Refills: 1 | Status: SHIPPED | OUTPATIENT
Start: 2024-02-12

## 2024-02-12 NOTE — TELEPHONE ENCOUNTER
Medication Refill Request    Thomas Lam Jr is requesting a refill of the following medication(s):     Lovastatin (Mevacor) 20 MG tablet    Please send refill to:     [facility]/pharmacy #[id number] - [place], [place] - [address] [phone] - F [phone] [address] [address]  Phone: [phone] Fax: [phone]

## 2024-02-13 NOTE — TELEPHONE ENCOUNTER
Future Appointments   Date Time Provider Department Center   3/27/2024  1:20 PM Edison Martin MD [facility]

## 2024-02-14 DIAGNOSIS — E78.00 PURE HYPERCHOLESTEROLEMIA, UNSPECIFIED: ICD-10-CM

## 2024-02-14 RX ORDER — LOVASTATIN 20 MG/1
TABLET ORAL
Qty: 30 TABLET | Refills: 1 | OUTPATIENT
Start: 2024-02-14

## 2024-02-15 ENCOUNTER — TELEPHONE (OUTPATIENT)
Age: 64
End: 2024-02-15

## 2024-03-27 ENCOUNTER — OFFICE VISIT (OUTPATIENT)
Age: 64
End: 2024-03-27
Payer: COMMERCIAL

## 2024-03-27 VITALS
BODY MASS INDEX: 32.28 KG/M2 | HEART RATE: 80 BPM | TEMPERATURE: 97.3 F | DIASTOLIC BLOOD PRESSURE: 80 MMHG | WEIGHT: 230.6 LBS | SYSTOLIC BLOOD PRESSURE: 123 MMHG | HEIGHT: 71 IN | RESPIRATION RATE: 16 BRPM | OXYGEN SATURATION: 95 %

## 2024-03-27 DIAGNOSIS — E66.9 OBESITY (BMI 30.0-34.9): ICD-10-CM

## 2024-03-27 DIAGNOSIS — E78.00 PURE HYPERCHOLESTEROLEMIA: ICD-10-CM

## 2024-03-27 DIAGNOSIS — N40.1 BENIGN LOCALIZED PROSTATIC HYPERPLASIA WITH LOWER URINARY TRACT SYMPTOMS (LUTS): ICD-10-CM

## 2024-03-27 DIAGNOSIS — Z71.89 ADVANCED CARE PLANNING/COUNSELING DISCUSSION: ICD-10-CM

## 2024-03-27 DIAGNOSIS — Z86.718 HISTORY OF DVT (DEEP VEIN THROMBOSIS): ICD-10-CM

## 2024-03-27 DIAGNOSIS — Z00.00 ROUTINE PHYSICAL EXAMINATION: Primary | ICD-10-CM

## 2024-03-27 DIAGNOSIS — I83.91 ASYMPTOMATIC VARICOSE VEINS OF RIGHT LOWER EXTREMITY: ICD-10-CM

## 2024-03-27 DIAGNOSIS — N52.9 ERECTILE DYSFUNCTION, UNSPECIFIED ERECTILE DYSFUNCTION TYPE: ICD-10-CM

## 2024-03-27 DIAGNOSIS — R73.03 PREDIABETES: ICD-10-CM

## 2024-03-27 PROCEDURE — 99214 OFFICE O/P EST MOD 30 MIN: CPT | Performed by: INTERNAL MEDICINE

## 2024-03-27 PROCEDURE — 99396 PREV VISIT EST AGE 40-64: CPT | Performed by: INTERNAL MEDICINE

## 2024-03-27 SDOH — ECONOMIC STABILITY: HOUSING INSECURITY
IN THE LAST 12 MONTHS, WAS THERE A TIME WHEN YOU DID NOT HAVE A STEADY PLACE TO SLEEP OR SLEPT IN A SHELTER (INCLUDING NOW)?: NO

## 2024-03-27 SDOH — ECONOMIC STABILITY: FOOD INSECURITY: WITHIN THE PAST 12 MONTHS, THE FOOD YOU BOUGHT JUST DIDN'T LAST AND YOU DIDN'T HAVE MONEY TO GET MORE.: NEVER TRUE

## 2024-03-27 SDOH — ECONOMIC STABILITY: FOOD INSECURITY: WITHIN THE PAST 12 MONTHS, YOU WORRIED THAT YOUR FOOD WOULD RUN OUT BEFORE YOU GOT MONEY TO BUY MORE.: NEVER TRUE

## 2024-03-27 SDOH — ECONOMIC STABILITY: INCOME INSECURITY: HOW HARD IS IT FOR YOU TO PAY FOR THE VERY BASICS LIKE FOOD, HOUSING, MEDICAL CARE, AND HEATING?: NOT HARD AT ALL

## 2024-03-27 ASSESSMENT — LIFESTYLE VARIABLES
HOW MANY STANDARD DRINKS CONTAINING ALCOHOL DO YOU HAVE ON A TYPICAL DAY: 3 OR 4
HOW OFTEN DO YOU HAVE A DRINK CONTAINING ALCOHOL: 2-3 TIMES A WEEK

## 2024-03-27 ASSESSMENT — ENCOUNTER SYMPTOMS
NAUSEA: 0
ABDOMINAL PAIN: 0
BLOOD IN STOOL: 0
VOMITING: 0
COUGH: 0
CONSTIPATION: 0
DIARRHEA: 0
SHORTNESS OF BREATH: 0

## 2024-03-27 ASSESSMENT — PATIENT HEALTH QUESTIONNAIRE - PHQ9
SUM OF ALL RESPONSES TO PHQ QUESTIONS 1-9: 0
SUM OF ALL RESPONSES TO PHQ QUESTIONS 1-9: 0
1. LITTLE INTEREST OR PLEASURE IN DOING THINGS: NOT AT ALL
SUM OF ALL RESPONSES TO PHQ9 QUESTIONS 1 & 2: 0
SUM OF ALL RESPONSES TO PHQ QUESTIONS 1-9: 0
2. FEELING DOWN, DEPRESSED OR HOPELESS: NOT AT ALL
SUM OF ALL RESPONSES TO PHQ QUESTIONS 1-9: 0

## 2024-03-27 NOTE — ACP (ADVANCE CARE PLANNING)
Advance Care Planning   Advance Care Planning (ACP) Physician/NP/PA (Provider) Conversation    Date of ACP Conversation: 03/27/24  Persons included in Conversation:  patient and spouse  Length of ACP Conversation in minutes: <16 minutes (Non-Billable)    We discussed the patient’s choices for care and treatment preferences in case of a health event that adversely affects decision-making abilities or is life-limiting. We discusses the differences between FULL CODE and DNR and when to consider a change in code status.  The limitations with CPR were reviewed.    Has NO ACP documents-Information provided.  He is in the process of filing this out with his . He elects Full Code (Attempt Resuscitation)    The patient has appointed the following active healthcare agents:    Primary Decision Maker: Katelyn LOPEZ - Spouse - [phone]     The Patient has the following current code status:    Code Status: Full Code    Edison Martin MD

## 2024-03-27 NOTE — PROGRESS NOTES
Thomas Lam Jr is a 63 y.o. male who was seen in clinic today (3/27/2024) for a full physical.        Assessment & Plan:   Below is the assessment and plan developed based on review of pertinent history, physical exam, labs, studies, and medications.    1. Routine physical examination  -     Comprehensive Metabolic Panel; Future  -     CBC; Future  -     Lipid Panel; Future  -     PSA, Diagnostic; Future  -     Hemoglobin A1C; Future  -     HIV 1/2 Ag/Ab, 4TH Generation,W Rflx Confirm; Future  2. Advanced care planning/counseling discussion  -     FULL CODE  3. Pure hypercholesterolemia  Assessment & Plan:  previously well controlled, continue current treatment pending review of labs    Orders:  -     Comprehensive Metabolic Panel; Future  -     Lipid Panel; Future  4. History of DVT (deep vein thrombosis)  Assessment & Plan:  Asymptomatic, was unprovoked, we will continue medication life long  Orders:  -     CBC; Future  5. Prediabetes  Assessment & Plan:  Likely stable.  I have recommended the following interventions: dietary management education, guidance, and counseling.    Orders:  -     Comprehensive Metabolic Panel; Future  -     Hemoglobin A1C; Future  6. Obesity (BMI 30.0-34.9)  Assessment & Plan:  stable, needs improvement, I have recommended the following interventions: dietary management education, guidance, and counseling and encourage exercise.    7. Erectile dysfunction, unspecified erectile dysfunction type  Assessment & Plan:  Stable to slightly worse.  Reviewed medication options (Viagra vs Cialis).  He will monitor and let me know if he interested in trying meds   8. Benign localized prostatic hyperplasia with lower urinary tract symptoms (LUTS)  Assessment & Plan:  New dx, chronic issue, worsening.  We reviewed life style changes.  We discussed medication options.  We will defer for now but he will notify me if it gets worse   9. Asymptomatic varicose veins of right lower

## 2024-03-28 DIAGNOSIS — N52.9 ERECTILE DYSFUNCTION, UNSPECIFIED ERECTILE DYSFUNCTION TYPE: Primary | ICD-10-CM

## 2024-03-28 RX ORDER — TADALAFIL 10 MG/1
10 TABLET ORAL DAILY PRN
Qty: 10 TABLET | Refills: 5 | Status: SHIPPED | OUTPATIENT
Start: 2024-03-28

## 2024-03-28 NOTE — ASSESSMENT & PLAN NOTE
Stable to slightly worse.  Reviewed medication options (Viagra vs Cialis).  He will monitor and let me know if he interested in trying meds

## 2024-03-28 NOTE — ASSESSMENT & PLAN NOTE
Asymptomatic, but he reports R lower leg is larger.  Reviewed previous imaging.  We discussed repeating US vs CT scan.  We also discussed seeing vascular surgeon to see if any new treatment options.  He will think about it, monitor, and update me with what he wants to do.

## 2024-03-28 NOTE — ASSESSMENT & PLAN NOTE
Likely stable.  I have recommended the following interventions: dietary management education, guidance, and counseling.

## 2024-03-28 NOTE — ASSESSMENT & PLAN NOTE
stable, needs improvement, I have recommended the following interventions: dietary management education, guidance, and counseling and encourage exercise.

## 2024-03-28 NOTE — ASSESSMENT & PLAN NOTE
New dx, chronic issue, worsening.  We reviewed life style changes.  We discussed medication options.  We will defer for now but he will notify me if it gets worse

## 2024-04-08 DIAGNOSIS — E78.00 PURE HYPERCHOLESTEROLEMIA, UNSPECIFIED: ICD-10-CM

## 2024-04-08 RX ORDER — LOVASTATIN 20 MG/1
TABLET ORAL
Qty: 30 TABLET | Refills: 0 | Status: SHIPPED | OUTPATIENT
Start: 2024-04-08

## 2024-04-08 NOTE — TELEPHONE ENCOUNTER
Medication Refill Request    Thomas Lam Jr is requesting a refill of the following medication(s):   lovastatin  Please send refill to:     [facility]/pharmacy #[id number] - [place], [place] - [address] [address] [phone] - F [phone]  [address] [address] [address]  Phone: [phone] Fax: [phone]

## 2024-04-10 DIAGNOSIS — E78.00 PURE HYPERCHOLESTEROLEMIA: ICD-10-CM

## 2024-04-10 DIAGNOSIS — Z86.718 HISTORY OF DVT (DEEP VEIN THROMBOSIS): ICD-10-CM

## 2024-04-10 DIAGNOSIS — Z00.00 ROUTINE PHYSICAL EXAMINATION: ICD-10-CM

## 2024-04-10 DIAGNOSIS — R73.03 PREDIABETES: ICD-10-CM

## 2024-04-10 LAB
ALBUMIN SERPL-MCNC: 3.8 G/DL (ref 3.5–5)
ALBUMIN/GLOB SERPL: 1.2 (ref 1.1–2.2)
ALP SERPL-CCNC: 68 U/L (ref 45–117)
ALT SERPL-CCNC: 33 U/L (ref 12–78)
ANION GAP SERPL CALC-SCNC: 6 MMOL/L (ref 5–15)
AST SERPL-CCNC: 20 U/L (ref 15–37)
BILIRUB SERPL-MCNC: 0.6 MG/DL (ref 0.2–1)
BUN SERPL-MCNC: 17 MG/DL (ref 6–20)
BUN/CREAT SERPL: 17 (ref 12–20)
CALCIUM SERPL-MCNC: 9.5 MG/DL (ref 8.5–10.1)
CHLORIDE SERPL-SCNC: 110 MMOL/L (ref 97–108)
CHOLEST SERPL-MCNC: 169 MG/DL
CO2 SERPL-SCNC: 26 MMOL/L (ref 21–32)
CREAT SERPL-MCNC: 1 MG/DL (ref 0.7–1.3)
ERYTHROCYTE [DISTWIDTH] IN BLOOD BY AUTOMATED COUNT: 13.2 % (ref 11.5–14.5)
EST. AVERAGE GLUCOSE BLD GHB EST-MCNC: 128 MG/DL
GLOBULIN SER CALC-MCNC: 3.2 G/DL (ref 2–4)
GLUCOSE SERPL-MCNC: 110 MG/DL (ref 65–100)
HBA1C MFR BLD: 6.1 % (ref 4–5.6)
HCT VFR BLD AUTO: 46.8 % (ref 36.6–50.3)
HDLC SERPL-MCNC: 67 MG/DL
HDLC SERPL: 2.5 (ref 0–5)
HGB BLD-MCNC: 15 G/DL (ref 12.1–17)
HIV 1+2 AB+HIV1 P24 AG SERPL QL IA: NONREACTIVE
HIV 1/2 RESULT COMMENT: NORMAL
LDLC SERPL CALC-MCNC: 88.6 MG/DL (ref 0–100)
MCH RBC QN AUTO: 29.2 PG (ref 26–34)
MCHC RBC AUTO-ENTMCNC: 32.1 G/DL (ref 30–36.5)
MCV RBC AUTO: 91.2 FL (ref 80–99)
NRBC # BLD: 0 K/UL (ref 0–0.01)
NRBC BLD-RTO: 0 PER 100 WBC
PLATELET # BLD AUTO: 223 K/UL (ref 150–400)
PMV BLD AUTO: 10.7 FL (ref 8.9–12.9)
POTASSIUM SERPL-SCNC: 4.6 MMOL/L (ref 3.5–5.1)
PROT SERPL-MCNC: 7 G/DL (ref 6.4–8.2)
PSA SERPL-MCNC: 1.5 NG/ML (ref 0.01–4)
RBC # BLD AUTO: 5.13 M/UL (ref 4.1–5.7)
SODIUM SERPL-SCNC: 142 MMOL/L (ref 136–145)
TRIGL SERPL-MCNC: 67 MG/DL
VLDLC SERPL CALC-MCNC: 13.4 MG/DL
WBC # BLD AUTO: 4.7 K/UL (ref 4.1–11.1)

## 2024-04-16 DIAGNOSIS — E78.00 PURE HYPERCHOLESTEROLEMIA: Primary | ICD-10-CM

## 2024-05-09 DIAGNOSIS — E78.00 PURE HYPERCHOLESTEROLEMIA, UNSPECIFIED: ICD-10-CM

## 2024-05-09 RX ORDER — LOVASTATIN 20 MG/1
TABLET ORAL
Qty: 90 TABLET | Refills: 3 | Status: SHIPPED | OUTPATIENT
Start: 2024-05-09

## 2024-05-09 NOTE — TELEPHONE ENCOUNTER
Chief Complaint   Patient presents with    Medication Refill     Last Appointment with Dr. Edison Martin:  3/27/24    Future Appointments   Date Time Provider Department Center   5/10/2024  9:30 AM SPT CT 1 SPTRCT [place] [facility]   3/31/2025  9:00 AM Edison Martin MD [facility] AMB     [facility]

## 2024-05-10 ENCOUNTER — HOSPITAL ENCOUNTER (OUTPATIENT)
Facility: HOSPITAL | Age: 64
Discharge: HOME | End: 2024-05-10
Attending: INTERNAL MEDICINE

## 2024-05-10 DIAGNOSIS — E78.00 PURE HYPERCHOLESTEROLEMIA: ICD-10-CM

## 2024-05-10 PROCEDURE — 75571 CT HRT W/O DYE W/CA TEST: CPT

## 2024-05-13 DIAGNOSIS — I25.10 NON-OCCLUSIVE CORONARY ARTERY DISEASE: Primary | ICD-10-CM

## 2024-05-13 RX ORDER — ROSUVASTATIN CALCIUM 20 MG/1
20 TABLET, COATED ORAL DAILY
Qty: 90 TABLET | Refills: 0 | Status: SHIPPED | OUTPATIENT
Start: 2024-05-13

## 2024-05-13 NOTE — RESULT ENCOUNTER NOTE
Results reviewed.  Results released via [employer]  Previous score of 70 in '11 and now 1,324.  Will recommend statin change and talk to cardiology.

## 2024-08-02 DIAGNOSIS — I82.811 EMBOLISM AND THROMBOSIS OF SUPERFICIAL VEINS OF RIGHT LOWER EXTREMITY: ICD-10-CM

## 2024-08-02 DIAGNOSIS — Z86.718 PERSONAL HISTORY OF OTHER VENOUS THROMBOSIS AND EMBOLISM: ICD-10-CM

## 2024-08-02 RX ORDER — RIVAROXABAN 20 MG/1
20 TABLET, FILM COATED ORAL DAILY
Qty: 90 TABLET | Refills: 1 | Status: SHIPPED | OUTPATIENT
Start: 2024-08-02

## 2024-08-09 DIAGNOSIS — I25.10 NON-OCCLUSIVE CORONARY ARTERY DISEASE: ICD-10-CM

## 2024-08-09 RX ORDER — ROSUVASTATIN CALCIUM 20 MG/1
20 TABLET, COATED ORAL DAILY
Qty: 90 TABLET | Refills: 1 | Status: SHIPPED | OUTPATIENT
Start: 2024-08-09